# Patient Record
Sex: FEMALE | Race: WHITE | NOT HISPANIC OR LATINO
[De-identification: names, ages, dates, MRNs, and addresses within clinical notes are randomized per-mention and may not be internally consistent; named-entity substitution may affect disease eponyms.]

---

## 2017-11-17 ENCOUNTER — TRANSCRIPTION ENCOUNTER (OUTPATIENT)
Age: 77
End: 2017-11-17

## 2019-04-24 ENCOUNTER — RX RENEWAL (OUTPATIENT)
Age: 79
End: 2019-04-24

## 2019-04-24 DIAGNOSIS — Z87.448 PERSONAL HISTORY OF OTHER DISEASES OF URINARY SYSTEM: ICD-10-CM

## 2019-04-24 DIAGNOSIS — Z87.440 PERSONAL HISTORY OF URINARY (TRACT) INFECTIONS: ICD-10-CM

## 2019-04-24 DIAGNOSIS — N13.2 HYDRONEPHROSIS WITH RENAL AND URETERAL CALCULOUS OBSTRUCTION: ICD-10-CM

## 2019-05-22 ENCOUNTER — APPOINTMENT (OUTPATIENT)
Dept: INTERNAL MEDICINE | Facility: CLINIC | Age: 79
End: 2019-05-22
Payer: MEDICARE

## 2019-05-22 VITALS
SYSTOLIC BLOOD PRESSURE: 130 MMHG | BODY MASS INDEX: 22.66 KG/M2 | DIASTOLIC BLOOD PRESSURE: 70 MMHG | RESPIRATION RATE: 16 BRPM | WEIGHT: 120 LBS | HEIGHT: 61 IN

## 2019-05-22 DIAGNOSIS — I10 ESSENTIAL (PRIMARY) HYPERTENSION: ICD-10-CM

## 2019-05-22 DIAGNOSIS — Z87.442 PERSONAL HISTORY OF URINARY CALCULI: ICD-10-CM

## 2019-05-22 PROCEDURE — 99213 OFFICE O/P EST LOW 20 MIN: CPT

## 2019-05-22 NOTE — HISTORY OF PRESENT ILLNESS
[FreeTextEntry1] : f/u on bp [de-identified] : hx htn on rxn since 6/16--had anxiety but controlled now

## 2019-06-05 ENCOUNTER — RX RENEWAL (OUTPATIENT)
Age: 79
End: 2019-06-05

## 2019-06-12 ENCOUNTER — RX RENEWAL (OUTPATIENT)
Age: 79
End: 2019-06-12

## 2019-10-05 ENCOUNTER — RECORD ABSTRACTING (OUTPATIENT)
Age: 79
End: 2019-10-05

## 2019-10-05 DIAGNOSIS — I70.0 ATHEROSCLEROSIS OF AORTA: ICD-10-CM

## 2019-10-05 DIAGNOSIS — M72.2 PLANTAR FASCIAL FIBROMATOSIS: ICD-10-CM

## 2019-10-05 DIAGNOSIS — M48.02 SPINAL STENOSIS, CERVICAL REGION: ICD-10-CM

## 2022-01-01 ENCOUNTER — NON-APPOINTMENT (OUTPATIENT)
Age: 82
End: 2022-01-01

## 2022-01-01 ENCOUNTER — APPOINTMENT (OUTPATIENT)
Dept: INTERNAL MEDICINE | Facility: CLINIC | Age: 82
End: 2022-01-01

## 2022-01-01 ENCOUNTER — TRANSCRIPTION ENCOUNTER (OUTPATIENT)
Age: 82
End: 2022-01-01

## 2022-01-01 ENCOUNTER — LABORATORY RESULT (OUTPATIENT)
Age: 82
End: 2022-01-01

## 2022-01-01 ENCOUNTER — APPOINTMENT (OUTPATIENT)
Dept: MRI IMAGING | Facility: CLINIC | Age: 82
End: 2022-01-01

## 2022-01-01 ENCOUNTER — EMERGENCY (EMERGENCY)
Facility: HOSPITAL | Age: 82
LOS: 1 days | Discharge: HOME | End: 2022-01-01
Attending: STUDENT IN AN ORGANIZED HEALTH CARE EDUCATION/TRAINING PROGRAM | Admitting: STUDENT IN AN ORGANIZED HEALTH CARE EDUCATION/TRAINING PROGRAM

## 2022-01-01 ENCOUNTER — APPOINTMENT (OUTPATIENT)
Dept: HEMATOLOGY ONCOLOGY | Facility: CLINIC | Age: 82
End: 2022-01-01

## 2022-01-01 ENCOUNTER — RESULT REVIEW (OUTPATIENT)
Age: 82
End: 2022-01-01

## 2022-01-01 ENCOUNTER — OUTPATIENT (OUTPATIENT)
Dept: OUTPATIENT SERVICES | Facility: HOSPITAL | Age: 82
LOS: 1 days | End: 2022-01-01
Payer: MEDICARE

## 2022-01-01 ENCOUNTER — APPOINTMENT (OUTPATIENT)
Dept: NUCLEAR MEDICINE | Facility: IMAGING CENTER | Age: 82
End: 2022-01-01

## 2022-01-01 ENCOUNTER — INPATIENT (INPATIENT)
Facility: HOSPITAL | Age: 82
LOS: 6 days | Discharge: HOME | End: 2022-11-01
Attending: INTERNAL MEDICINE | Admitting: INTERNAL MEDICINE

## 2022-01-01 ENCOUNTER — APPOINTMENT (OUTPATIENT)
Dept: RADIATION ONCOLOGY | Facility: CLINIC | Age: 82
End: 2022-01-01

## 2022-01-01 ENCOUNTER — APPOINTMENT (OUTPATIENT)
Dept: INTERNAL MEDICINE | Facility: CLINIC | Age: 82
End: 2022-01-01
Payer: MEDICARE

## 2022-01-01 ENCOUNTER — OUTPATIENT (OUTPATIENT)
Dept: OUTPATIENT SERVICES | Facility: HOSPITAL | Age: 82
LOS: 1 days | Discharge: HOME | End: 2022-01-01

## 2022-01-01 ENCOUNTER — APPOINTMENT (OUTPATIENT)
Dept: CT IMAGING | Facility: IMAGING CENTER | Age: 82
End: 2022-01-01

## 2022-01-01 VITALS
HEART RATE: 81 BPM | WEIGHT: 110 LBS | HEIGHT: 58.43 IN | TEMPERATURE: 208.76 F | BODY MASS INDEX: 22.78 KG/M2 | OXYGEN SATURATION: 99 %

## 2022-01-01 VITALS
RESPIRATION RATE: 16 BRPM | BODY MASS INDEX: 23 KG/M2 | HEART RATE: 78 BPM | SYSTOLIC BLOOD PRESSURE: 148 MMHG | OXYGEN SATURATION: 100 % | DIASTOLIC BLOOD PRESSURE: 87 MMHG | TEMPERATURE: 98.6 F | WEIGHT: 111.66 LBS

## 2022-01-01 VITALS
OXYGEN SATURATION: 98 % | HEIGHT: 61 IN | BODY MASS INDEX: 22.66 KG/M2 | WEIGHT: 120 LBS | HEART RATE: 75 BPM | TEMPERATURE: 97.8 F

## 2022-01-01 VITALS
OXYGEN SATURATION: 100 % | DIASTOLIC BLOOD PRESSURE: 82 MMHG | WEIGHT: 109.35 LBS | SYSTOLIC BLOOD PRESSURE: 149 MMHG | HEART RATE: 86 BPM | TEMPERATURE: 208.58 F | HEIGHT: 58.78 IN | BODY MASS INDEX: 22.34 KG/M2 | RESPIRATION RATE: 16 BRPM

## 2022-01-01 VITALS
OXYGEN SATURATION: 100 % | RESPIRATION RATE: 16 BRPM | HEART RATE: 81 BPM | DIASTOLIC BLOOD PRESSURE: 75 MMHG | SYSTOLIC BLOOD PRESSURE: 155 MMHG | TEMPERATURE: 98 F | HEIGHT: 60 IN

## 2022-01-01 VITALS
TEMPERATURE: 98 F | HEART RATE: 82 BPM | HEIGHT: 58 IN | SYSTOLIC BLOOD PRESSURE: 173 MMHG | DIASTOLIC BLOOD PRESSURE: 69 MMHG | OXYGEN SATURATION: 99 % | RESPIRATION RATE: 16 BRPM

## 2022-01-01 VITALS — SYSTOLIC BLOOD PRESSURE: 126 MMHG | DIASTOLIC BLOOD PRESSURE: 76 MMHG

## 2022-01-01 VITALS
OXYGEN SATURATION: 98 % | RESPIRATION RATE: 18 BRPM | HEART RATE: 88 BPM | DIASTOLIC BLOOD PRESSURE: 50 MMHG | TEMPERATURE: 99 F | SYSTOLIC BLOOD PRESSURE: 147 MMHG

## 2022-01-01 VITALS — SYSTOLIC BLOOD PRESSURE: 126 MMHG | DIASTOLIC BLOOD PRESSURE: 74 MMHG

## 2022-01-01 VITALS
BODY MASS INDEX: 22.28 KG/M2 | WEIGHT: 117.99 LBS | TEMPERATURE: 98.1 F | HEART RATE: 81 BPM | HEIGHT: 61 IN | RESPIRATION RATE: 16 BRPM | SYSTOLIC BLOOD PRESSURE: 135 MMHG | DIASTOLIC BLOOD PRESSURE: 74 MMHG | OXYGEN SATURATION: 100 %

## 2022-01-01 VITALS
RESPIRATION RATE: 19 BRPM | DIASTOLIC BLOOD PRESSURE: 93 MMHG | SYSTOLIC BLOOD PRESSURE: 174 MMHG | HEART RATE: 87 BPM | OXYGEN SATURATION: 100 %

## 2022-01-01 DIAGNOSIS — Z98.89 OTHER SPECIFIED POSTPROCEDURAL STATES: Chronic | ICD-10-CM

## 2022-01-01 DIAGNOSIS — E78.5 HYPERLIPIDEMIA, UNSPECIFIED: ICD-10-CM

## 2022-01-01 DIAGNOSIS — N39.0 URINARY TRACT INFECTION, SITE NOT SPECIFIED: ICD-10-CM

## 2022-01-01 DIAGNOSIS — C79.9 SECONDARY MALIGNANT NEOPLASM OF UNSPECIFIED SITE: ICD-10-CM

## 2022-01-01 DIAGNOSIS — C80.1 MALIGNANT (PRIMARY) NEOPLASM, UNSPECIFIED: ICD-10-CM

## 2022-01-01 DIAGNOSIS — C25.9 MALIGNANT NEOPLASM OF PANCREAS, UNSPECIFIED: ICD-10-CM

## 2022-01-01 DIAGNOSIS — Z29.9 ENCOUNTER FOR PROPHYLACTIC MEASURES, UNSPECIFIED: ICD-10-CM

## 2022-01-01 DIAGNOSIS — N17.9 ACUTE KIDNEY FAILURE, UNSPECIFIED: ICD-10-CM

## 2022-01-01 DIAGNOSIS — Z29.8 ENCOUNTER FOR OTHER SPECIFIED PROPHYLACTIC MEASURES: ICD-10-CM

## 2022-01-01 DIAGNOSIS — R91.1 SOLITARY PULMONARY NODULE: ICD-10-CM

## 2022-01-01 DIAGNOSIS — N88.8 OTHER SPECIFIED NONINFLAMMATORY DISORDERS OF CERVIX UTERI: ICD-10-CM

## 2022-01-01 DIAGNOSIS — Z51.5 ENCOUNTER FOR PALLIATIVE CARE: ICD-10-CM

## 2022-01-01 DIAGNOSIS — E87.1 HYPO-OSMOLALITY AND HYPONATREMIA: ICD-10-CM

## 2022-01-01 DIAGNOSIS — L98.9 DISORDER OF THE SKIN AND SUBCUTANEOUS TISSUE, UNSPECIFIED: ICD-10-CM

## 2022-01-01 DIAGNOSIS — Z00.00 ENCOUNTER FOR GENERAL ADULT MEDICAL EXAMINATION W/OUT ABNORMAL FINDINGS: ICD-10-CM

## 2022-01-01 DIAGNOSIS — R91.8 OTHER NONSPECIFIC ABNORMAL FINDING OF LUNG FIELD: ICD-10-CM

## 2022-01-01 DIAGNOSIS — D72.829 ELEVATED WHITE BLOOD CELL COUNT, UNSPECIFIED: ICD-10-CM

## 2022-01-01 DIAGNOSIS — Z71.89 OTHER SPECIFIED COUNSELING: ICD-10-CM

## 2022-01-01 DIAGNOSIS — C34.90 MALIGNANT NEOPLASM OF UNSPECIFIED PART OF UNSPECIFIED BRONCHUS OR LUNG: ICD-10-CM

## 2022-01-01 DIAGNOSIS — F41.9 ANXIETY DISORDER, UNSPECIFIED: ICD-10-CM

## 2022-01-01 LAB
-  AMIKACIN: SIGNIFICANT CHANGE UP
-  AMIKACIN: SIGNIFICANT CHANGE UP
-  AMOXICILLIN/CLAVULANIC ACID: SIGNIFICANT CHANGE UP
-  AMOXICILLIN/CLAVULANIC ACID: SIGNIFICANT CHANGE UP
-  AMPICILLIN/SULBACTAM: SIGNIFICANT CHANGE UP
-  AMPICILLIN/SULBACTAM: SIGNIFICANT CHANGE UP
-  AMPICILLIN: SIGNIFICANT CHANGE UP
-  AZTREONAM: SIGNIFICANT CHANGE UP
-  AZTREONAM: SIGNIFICANT CHANGE UP
-  CEFAZOLIN: SIGNIFICANT CHANGE UP
-  CEFAZOLIN: SIGNIFICANT CHANGE UP
-  CEFEPIME: SIGNIFICANT CHANGE UP
-  CEFEPIME: SIGNIFICANT CHANGE UP
-  CEFOXITIN: SIGNIFICANT CHANGE UP
-  CEFOXITIN: SIGNIFICANT CHANGE UP
-  CEFTRIAXONE: SIGNIFICANT CHANGE UP
-  CEFTRIAXONE: SIGNIFICANT CHANGE UP
-  CIPROFLOXACIN: SIGNIFICANT CHANGE UP
-  ERTAPENEM: SIGNIFICANT CHANGE UP
-  ERTAPENEM: SIGNIFICANT CHANGE UP
-  GENTAMICIN: SIGNIFICANT CHANGE UP
-  GENTAMICIN: SIGNIFICANT CHANGE UP
-  IMIPENEM: SIGNIFICANT CHANGE UP
-  LEVOFLOXACIN: SIGNIFICANT CHANGE UP
-  MEROPENEM: SIGNIFICANT CHANGE UP
-  MEROPENEM: SIGNIFICANT CHANGE UP
-  NITROFURANTOIN: SIGNIFICANT CHANGE UP
-  PIPERACILLIN/TAZOBACTAM: SIGNIFICANT CHANGE UP
-  PIPERACILLIN/TAZOBACTAM: SIGNIFICANT CHANGE UP
-  TETRACYCLINE: SIGNIFICANT CHANGE UP
-  TIGECYCLINE: SIGNIFICANT CHANGE UP
-  TOBRAMYCIN: SIGNIFICANT CHANGE UP
-  TOBRAMYCIN: SIGNIFICANT CHANGE UP
-  TRIMETHOPRIM/SULFAMETHOXAZOLE: SIGNIFICANT CHANGE UP
-  TRIMETHOPRIM/SULFAMETHOXAZOLE: SIGNIFICANT CHANGE UP
-  VANCOMYCIN: SIGNIFICANT CHANGE UP
ALBUMIN SERPL ELPH-MCNC: 4 G/DL
ALBUMIN SERPL ELPH-MCNC: 4.1 G/DL — SIGNIFICANT CHANGE UP (ref 3.3–5)
ALBUMIN SERPL ELPH-MCNC: 4.3 G/DL — SIGNIFICANT CHANGE UP (ref 3.3–5)
ALBUMIN SERPL ELPH-MCNC: 4.6 G/DL
ALP BLD-CCNC: 57 U/L
ALP BLD-CCNC: 60 U/L
ALP SERPL-CCNC: 59 U/L — SIGNIFICANT CHANGE UP (ref 40–120)
ALP SERPL-CCNC: 63 U/L — SIGNIFICANT CHANGE UP (ref 40–120)
ALT FLD-CCNC: 12 U/L — SIGNIFICANT CHANGE UP (ref 4–33)
ALT FLD-CCNC: 15 U/L — SIGNIFICANT CHANGE UP (ref 4–33)
ALT SERPL-CCNC: 15 U/L
ALT SERPL-CCNC: 18 U/L
ANION GAP SERPL CALC-SCNC: 10 MMOL/L — SIGNIFICANT CHANGE UP (ref 7–14)
ANION GAP SERPL CALC-SCNC: 10 MMOL/L — SIGNIFICANT CHANGE UP (ref 7–14)
ANION GAP SERPL CALC-SCNC: 11 MMOL/L — SIGNIFICANT CHANGE UP (ref 7–14)
ANION GAP SERPL CALC-SCNC: 12 MMOL/L — SIGNIFICANT CHANGE UP (ref 7–14)
ANION GAP SERPL CALC-SCNC: 13 MMOL/L
ANION GAP SERPL CALC-SCNC: 15 MMOL/L — HIGH (ref 7–14)
ANION GAP SERPL CALC-SCNC: 16 MMOL/L
APPEARANCE UR: ABNORMAL
APPEARANCE UR: CLEAR — SIGNIFICANT CHANGE UP
APPEARANCE: ABNORMAL
APTT BLD: 27.6 SEC — SIGNIFICANT CHANGE UP (ref 27–36.3)
APTT BLD: 28.2 SEC — SIGNIFICANT CHANGE UP (ref 27–36.3)
APTT BLD: 28.9 SEC — SIGNIFICANT CHANGE UP (ref 27–36.3)
APTT BLD: 29.9 SEC — SIGNIFICANT CHANGE UP (ref 27–36.3)
AST SERPL-CCNC: 22 U/L
AST SERPL-CCNC: 22 U/L — SIGNIFICANT CHANGE UP (ref 4–32)
AST SERPL-CCNC: 24 U/L
AST SERPL-CCNC: 24 U/L — SIGNIFICANT CHANGE UP (ref 4–32)
BACTERIA # UR AUTO: ABNORMAL
BACTERIA # UR AUTO: NEGATIVE — SIGNIFICANT CHANGE UP
BACTERIA: NEGATIVE
BASE EXCESS BLDV CALC-SCNC: 3.2 MMOL/L — HIGH (ref -2–3)
BASOPHILS # BLD AUTO: 0.03 K/UL — SIGNIFICANT CHANGE UP (ref 0–0.2)
BASOPHILS # BLD AUTO: 0.05 K/UL — SIGNIFICANT CHANGE UP (ref 0–0.2)
BASOPHILS # BLD AUTO: 0.06 K/UL — SIGNIFICANT CHANGE UP (ref 0–0.2)
BASOPHILS # BLD AUTO: 0.06 K/UL — SIGNIFICANT CHANGE UP (ref 0–0.2)
BASOPHILS NFR BLD AUTO: 0.2 % — SIGNIFICANT CHANGE UP (ref 0–2)
BASOPHILS NFR BLD AUTO: 0.4 % — SIGNIFICANT CHANGE UP (ref 0–2)
BASOPHILS NFR BLD AUTO: 0.4 % — SIGNIFICANT CHANGE UP (ref 0–2)
BASOPHILS NFR BLD AUTO: 0.5 % — SIGNIFICANT CHANGE UP (ref 0–2)
BILIRUB SERPL-MCNC: 0.2 MG/DL
BILIRUB SERPL-MCNC: 0.3 MG/DL
BILIRUB SERPL-MCNC: 0.3 MG/DL — SIGNIFICANT CHANGE UP (ref 0.2–1.2)
BILIRUB SERPL-MCNC: <0.2 MG/DL — SIGNIFICANT CHANGE UP (ref 0.2–1.2)
BILIRUB UR-MCNC: NEGATIVE — SIGNIFICANT CHANGE UP
BILIRUB UR-MCNC: NEGATIVE — SIGNIFICANT CHANGE UP
BILIRUBIN URINE: NEGATIVE
BLD GP AB SCN SERPL QL: NEGATIVE — SIGNIFICANT CHANGE UP
BLD GP AB SCN SERPL QL: NEGATIVE — SIGNIFICANT CHANGE UP
BLOOD GAS VENOUS COMPREHENSIVE RESULT: SIGNIFICANT CHANGE UP
BLOOD URINE: ABNORMAL
BUN SERPL-MCNC: 22 MG/DL — SIGNIFICANT CHANGE UP (ref 7–23)
BUN SERPL-MCNC: 25 MG/DL — HIGH (ref 7–23)
BUN SERPL-MCNC: 25 MG/DL — HIGH (ref 7–23)
BUN SERPL-MCNC: 26 MG/DL
BUN SERPL-MCNC: 27 MG/DL
BUN SERPL-MCNC: 28 MG/DL — HIGH (ref 7–23)
BUN SERPL-MCNC: 28 MG/DL — HIGH (ref 7–23)
BUN SERPL-MCNC: 32 MG/DL — HIGH (ref 7–23)
BUN SERPL-MCNC: 36 MG/DL — HIGH (ref 7–23)
BUN SERPL-MCNC: 37 MG/DL — HIGH (ref 7–23)
CALCIUM SERPL-MCNC: 10.1 MG/DL
CALCIUM SERPL-MCNC: 10.2 MG/DL — SIGNIFICANT CHANGE UP (ref 8.4–10.5)
CALCIUM SERPL-MCNC: 10.6 MG/DL
CALCIUM SERPL-MCNC: 10.7 MG/DL — HIGH (ref 8.4–10.5)
CALCIUM SERPL-MCNC: 9.8 MG/DL — SIGNIFICANT CHANGE UP (ref 8.4–10.5)
CALCIUM SERPL-MCNC: 9.9 MG/DL — SIGNIFICANT CHANGE UP (ref 8.4–10.5)
CALCIUM SERPL-MCNC: 9.9 MG/DL — SIGNIFICANT CHANGE UP (ref 8.4–10.5)
CANCER AG125 SERPL-ACNC: 77 U/ML
CANCER AG19-9 SERPL-ACNC: 182 U/ML
CANCER AG27-29 SERPL-ACNC: 78.8 U/ML
CEA SERPL-MCNC: 91.2 NG/ML
CGA SERPL-MCNC: 206.9 NG/ML
CHLORIDE BLDV-SCNC: 102 MMOL/L — SIGNIFICANT CHANGE UP (ref 96–108)
CHLORIDE SERPL-SCNC: 100 MMOL/L — SIGNIFICANT CHANGE UP (ref 98–107)
CHLORIDE SERPL-SCNC: 100 MMOL/L — SIGNIFICANT CHANGE UP (ref 98–107)
CHLORIDE SERPL-SCNC: 101 MMOL/L — SIGNIFICANT CHANGE UP (ref 98–107)
CHLORIDE SERPL-SCNC: 101 MMOL/L — SIGNIFICANT CHANGE UP (ref 98–107)
CHLORIDE SERPL-SCNC: 102 MMOL/L — SIGNIFICANT CHANGE UP (ref 98–107)
CHLORIDE SERPL-SCNC: 102 MMOL/L — SIGNIFICANT CHANGE UP (ref 98–107)
CHLORIDE SERPL-SCNC: 103 MMOL/L
CHLORIDE SERPL-SCNC: 97 MMOL/L — LOW (ref 98–107)
CHLORIDE SERPL-SCNC: 97 MMOL/L — LOW (ref 98–107)
CHLORIDE SERPL-SCNC: 98 MMOL/L
CHLORIDE UR-SCNC: 46 MMOL/L — SIGNIFICANT CHANGE UP
CO2 BLDV-SCNC: 28 MMOL/L — HIGH (ref 22–26)
CO2 SERPL-SCNC: 23 MMOL/L
CO2 SERPL-SCNC: 23 MMOL/L — SIGNIFICANT CHANGE UP (ref 22–31)
CO2 SERPL-SCNC: 24 MMOL/L
CO2 SERPL-SCNC: 24 MMOL/L — SIGNIFICANT CHANGE UP (ref 22–31)
COLOR SPEC: ABNORMAL
COLOR SPEC: SIGNIFICANT CHANGE UP
COLOR: NORMAL
CREAT SERPL-MCNC: 1.12 MG/DL — SIGNIFICANT CHANGE UP (ref 0.5–1.3)
CREAT SERPL-MCNC: 1.17 MG/DL — SIGNIFICANT CHANGE UP (ref 0.5–1.3)
CREAT SERPL-MCNC: 1.29 MG/DL — SIGNIFICANT CHANGE UP (ref 0.5–1.3)
CREAT SERPL-MCNC: 1.29 MG/DL — SIGNIFICANT CHANGE UP (ref 0.5–1.3)
CREAT SERPL-MCNC: 1.31 MG/DL — HIGH (ref 0.5–1.3)
CREAT SERPL-MCNC: 1.34 MG/DL
CREAT SERPL-MCNC: 1.34 MG/DL — HIGH (ref 0.5–1.3)
CREAT SERPL-MCNC: 1.37 MG/DL
CREAT SERPL-MCNC: 1.37 MG/DL — HIGH (ref 0.5–1.3)
CREAT SERPL-MCNC: 1.63 MG/DL — HIGH (ref 0.5–1.3)
CULTURE RESULTS: SIGNIFICANT CHANGE UP
DIFF PNL FLD: ABNORMAL
DIFF PNL FLD: NEGATIVE — SIGNIFICANT CHANGE UP
EGFR: 31 ML/MIN/1.73M2 — LOW
EGFR: 39 ML/MIN/1.73M2
EGFR: 39 ML/MIN/1.73M2 — LOW
EGFR: 40 ML/MIN/1.73M2
EGFR: 40 ML/MIN/1.73M2 — LOW
EGFR: 41 ML/MIN/1.73M2 — LOW
EGFR: 47 ML/MIN/1.73M2 — LOW
EGFR: 49 ML/MIN/1.73M2 — LOW
EOSINOPHIL # BLD AUTO: 0.03 K/UL — SIGNIFICANT CHANGE UP (ref 0–0.5)
EOSINOPHIL # BLD AUTO: 0.06 K/UL — SIGNIFICANT CHANGE UP (ref 0–0.5)
EOSINOPHIL # BLD AUTO: 0.09 K/UL — SIGNIFICANT CHANGE UP (ref 0–0.5)
EOSINOPHIL # BLD AUTO: 0.1 K/UL — SIGNIFICANT CHANGE UP (ref 0–0.5)
EOSINOPHIL # BLD AUTO: 0.1 K/UL — SIGNIFICANT CHANGE UP (ref 0–0.5)
EOSINOPHIL # BLD AUTO: 0.14 K/UL — SIGNIFICANT CHANGE UP (ref 0–0.5)
EOSINOPHIL NFR BLD AUTO: 0.3 % — SIGNIFICANT CHANGE UP (ref 0–6)
EOSINOPHIL NFR BLD AUTO: 0.4 % — SIGNIFICANT CHANGE UP (ref 0–6)
EOSINOPHIL NFR BLD AUTO: 0.7 % — SIGNIFICANT CHANGE UP (ref 0–6)
EOSINOPHIL NFR BLD AUTO: 0.9 % — SIGNIFICANT CHANGE UP (ref 0–6)
EOSINOPHIL NFR BLD AUTO: 0.9 % — SIGNIFICANT CHANGE UP (ref 0–6)
EOSINOPHIL NFR BLD AUTO: 1.4 % — SIGNIFICANT CHANGE UP (ref 0–6)
EPI CELLS # UR: 1 /HPF — SIGNIFICANT CHANGE UP (ref 0–5)
EPI CELLS # UR: 1 /HPF — SIGNIFICANT CHANGE UP (ref 0–5)
FLUAV AG NPH QL: SIGNIFICANT CHANGE UP
FLUBV AG NPH QL: SIGNIFICANT CHANGE UP
GAS PNL BLDV: 132 MMOL/L — LOW (ref 136–145)
GAS PNL BLDV: SIGNIFICANT CHANGE UP
GLUCOSE BLDV-MCNC: 108 MG/DL — HIGH (ref 70–99)
GLUCOSE QUALITATIVE U: NEGATIVE
GLUCOSE SERPL-MCNC: 101 MG/DL
GLUCOSE SERPL-MCNC: 102 MG/DL
GLUCOSE SERPL-MCNC: 104 MG/DL — HIGH (ref 70–99)
GLUCOSE SERPL-MCNC: 110 MG/DL — HIGH (ref 70–99)
GLUCOSE SERPL-MCNC: 92 MG/DL — SIGNIFICANT CHANGE UP (ref 70–99)
GLUCOSE SERPL-MCNC: 94 MG/DL — SIGNIFICANT CHANGE UP (ref 70–99)
GLUCOSE SERPL-MCNC: 96 MG/DL — SIGNIFICANT CHANGE UP (ref 70–99)
GLUCOSE SERPL-MCNC: 98 MG/DL — SIGNIFICANT CHANGE UP (ref 70–99)
GLUCOSE SERPL-MCNC: 98 MG/DL — SIGNIFICANT CHANGE UP (ref 70–99)
GLUCOSE SERPL-MCNC: 99 MG/DL — SIGNIFICANT CHANGE UP (ref 70–99)
GLUCOSE UR QL: NEGATIVE — SIGNIFICANT CHANGE UP
GLUCOSE UR QL: NEGATIVE — SIGNIFICANT CHANGE UP
GRAM STN FLD: SIGNIFICANT CHANGE UP
HCO3 BLDV-SCNC: 27 MMOL/L — SIGNIFICANT CHANGE UP (ref 22–29)
HCT VFR BLD CALC: 31.1 % — LOW (ref 34.5–45)
HCT VFR BLD CALC: 31.6 % — LOW (ref 34.5–45)
HCT VFR BLD CALC: 32.8 % — LOW (ref 34.5–45)
HCT VFR BLD CALC: 33.1 % — LOW (ref 34.5–45)
HCT VFR BLD CALC: 33.1 % — LOW (ref 34.5–45)
HCT VFR BLD CALC: 33.4 % — LOW (ref 34.5–45)
HCT VFR BLD CALC: 34 % — LOW (ref 34.5–45)
HCT VFR BLD CALC: 34.9 % — SIGNIFICANT CHANGE UP (ref 34.5–45)
HCT VFR BLD CALC: 35.7 % — SIGNIFICANT CHANGE UP (ref 34.5–45)
HCT VFR BLD CALC: 39.1 % — SIGNIFICANT CHANGE UP (ref 34.5–45)
HCT VFR BLDA CALC: 36 % — SIGNIFICANT CHANGE UP (ref 34.5–46.5)
HGB BLD CALC-MCNC: 12 G/DL — SIGNIFICANT CHANGE UP (ref 11.5–15.5)
HGB BLD-MCNC: 10.3 G/DL — LOW (ref 11.5–15.5)
HGB BLD-MCNC: 10.4 G/DL — LOW (ref 11.5–15.5)
HGB BLD-MCNC: 10.8 G/DL — LOW (ref 11.5–15.5)
HGB BLD-MCNC: 10.8 G/DL — LOW (ref 11.5–15.5)
HGB BLD-MCNC: 10.9 G/DL — LOW (ref 11.5–15.5)
HGB BLD-MCNC: 11.7 G/DL — SIGNIFICANT CHANGE UP (ref 11.5–15.5)
HGB BLD-MCNC: 11.8 G/DL — SIGNIFICANT CHANGE UP (ref 11.5–15.5)
HGB BLD-MCNC: 12.6 G/DL — SIGNIFICANT CHANGE UP (ref 11.5–15.5)
HYALINE CASTS: 0 /LPF
IANC: 11.31 K/UL — HIGH (ref 1.8–7.4)
IANC: 11.76 K/UL — HIGH (ref 1.8–7.4)
IANC: 6.99 K/UL — SIGNIFICANT CHANGE UP (ref 1.8–7.4)
IANC: 7.95 K/UL — HIGH (ref 1.8–7.4)
IMM GRANULOCYTES NFR BLD AUTO: 0.3 % — SIGNIFICANT CHANGE UP (ref 0–0.9)
IMM GRANULOCYTES NFR BLD AUTO: 0.4 % — SIGNIFICANT CHANGE UP (ref 0–0.9)
IMM GRANULOCYTES NFR BLD AUTO: 0.5 % — SIGNIFICANT CHANGE UP (ref 0–0.9)
IMM GRANULOCYTES NFR BLD AUTO: 0.8 % — SIGNIFICANT CHANGE UP (ref 0–0.9)
INR BLD: 0.97 RATIO — SIGNIFICANT CHANGE UP (ref 0.88–1.16)
INR BLD: 0.98 RATIO — SIGNIFICANT CHANGE UP (ref 0.88–1.16)
INR BLD: 1 RATIO — SIGNIFICANT CHANGE UP (ref 0.88–1.16)
INR BLD: 1.03 RATIO — SIGNIFICANT CHANGE UP (ref 0.88–1.16)
KETONES UR-MCNC: NEGATIVE — SIGNIFICANT CHANGE UP
KETONES UR-MCNC: NEGATIVE — SIGNIFICANT CHANGE UP
KETONES URINE: NEGATIVE
LACTATE BLDV-MCNC: 1.8 MMOL/L — SIGNIFICANT CHANGE UP (ref 0.5–2)
LEUKOCYTE ESTERASE UR-ACNC: ABNORMAL
LEUKOCYTE ESTERASE UR-ACNC: NEGATIVE — SIGNIFICANT CHANGE UP
LEUKOCYTE ESTERASE URINE: ABNORMAL
LIDOCAIN IGE QN: 32 U/L — SIGNIFICANT CHANGE UP (ref 7–60)
LYMPHOCYTES # BLD AUTO: 1.54 K/UL — SIGNIFICANT CHANGE UP (ref 1–3.3)
LYMPHOCYTES # BLD AUTO: 1.73 K/UL — SIGNIFICANT CHANGE UP (ref 1–3.3)
LYMPHOCYTES # BLD AUTO: 1.76 K/UL — SIGNIFICANT CHANGE UP (ref 1–3.3)
LYMPHOCYTES # BLD AUTO: 1.77 K/UL — SIGNIFICANT CHANGE UP (ref 1–3.3)
LYMPHOCYTES # BLD AUTO: 10.5 % — LOW (ref 13–44)
LYMPHOCYTES # BLD AUTO: 12.4 % — LOW (ref 13–44)
LYMPHOCYTES # BLD AUTO: 14.9 % — SIGNIFICANT CHANGE UP (ref 13–44)
LYMPHOCYTES # BLD AUTO: 16.8 % — SIGNIFICANT CHANGE UP (ref 13–44)
LYMPHOCYTES # BLD AUTO: 18.9 % — SIGNIFICANT CHANGE UP (ref 13–44)
LYMPHOCYTES # BLD AUTO: 19.3 % — SIGNIFICANT CHANGE UP (ref 13–44)
LYMPHOCYTES # BLD AUTO: 2.01 K/UL — SIGNIFICANT CHANGE UP (ref 1–3.3)
LYMPHOCYTES # BLD AUTO: 2.15 K/UL — SIGNIFICANT CHANGE UP (ref 1–3.3)
MAGNESIUM SERPL-MCNC: 1.9 MG/DL — SIGNIFICANT CHANGE UP (ref 1.6–2.6)
MAGNESIUM SERPL-MCNC: 2 MG/DL — SIGNIFICANT CHANGE UP (ref 1.6–2.6)
MAGNESIUM SERPL-MCNC: 2.1 MG/DL — SIGNIFICANT CHANGE UP (ref 1.6–2.6)
MAGNESIUM SERPL-MCNC: 2.1 MG/DL — SIGNIFICANT CHANGE UP (ref 1.6–2.6)
MAGNESIUM SERPL-MCNC: 2.2 MG/DL — SIGNIFICANT CHANGE UP (ref 1.6–2.6)
MAGNESIUM SERPL-MCNC: 2.3 MG/DL — SIGNIFICANT CHANGE UP (ref 1.6–2.6)
MCHC RBC-ENTMCNC: 27 PG — SIGNIFICANT CHANGE UP (ref 27–34)
MCHC RBC-ENTMCNC: 27.2 PG — SIGNIFICANT CHANGE UP (ref 27–34)
MCHC RBC-ENTMCNC: 27.3 PG — SIGNIFICANT CHANGE UP (ref 27–34)
MCHC RBC-ENTMCNC: 27.4 PG — SIGNIFICANT CHANGE UP (ref 27–34)
MCHC RBC-ENTMCNC: 27.5 PG — SIGNIFICANT CHANGE UP (ref 27–34)
MCHC RBC-ENTMCNC: 27.5 PG — SIGNIFICANT CHANGE UP (ref 27–34)
MCHC RBC-ENTMCNC: 27.6 PG — SIGNIFICANT CHANGE UP (ref 27–34)
MCHC RBC-ENTMCNC: 27.7 PG — SIGNIFICANT CHANGE UP (ref 27–34)
MCHC RBC-ENTMCNC: 32.1 GM/DL — SIGNIFICANT CHANGE UP (ref 32–36)
MCHC RBC-ENTMCNC: 32.2 G/DL — SIGNIFICANT CHANGE UP (ref 32–36)
MCHC RBC-ENTMCNC: 32.6 G/DL — SIGNIFICANT CHANGE UP (ref 32–36)
MCHC RBC-ENTMCNC: 32.6 GM/DL — SIGNIFICANT CHANGE UP (ref 32–36)
MCHC RBC-ENTMCNC: 32.6 GM/DL — SIGNIFICANT CHANGE UP (ref 32–36)
MCHC RBC-ENTMCNC: 32.9 GM/DL — SIGNIFICANT CHANGE UP (ref 32–36)
MCHC RBC-ENTMCNC: 32.9 GM/DL — SIGNIFICANT CHANGE UP (ref 32–36)
MCHC RBC-ENTMCNC: 33.1 GM/DL — SIGNIFICANT CHANGE UP (ref 32–36)
MCHC RBC-ENTMCNC: 33.4 GM/DL — SIGNIFICANT CHANGE UP (ref 32–36)
MCHC RBC-ENTMCNC: 33.5 GM/DL — SIGNIFICANT CHANGE UP (ref 32–36)
MCV RBC AUTO: 81.9 FL — SIGNIFICANT CHANGE UP (ref 80–100)
MCV RBC AUTO: 82.5 FL — SIGNIFICANT CHANGE UP (ref 80–100)
MCV RBC AUTO: 82.8 FL — SIGNIFICANT CHANGE UP (ref 80–100)
MCV RBC AUTO: 82.8 FL — SIGNIFICANT CHANGE UP (ref 80–100)
MCV RBC AUTO: 82.9 FL — SIGNIFICANT CHANGE UP (ref 80–100)
MCV RBC AUTO: 82.9 FL — SIGNIFICANT CHANGE UP (ref 80–100)
MCV RBC AUTO: 83.7 FL — SIGNIFICANT CHANGE UP (ref 80–100)
MCV RBC AUTO: 83.7 FL — SIGNIFICANT CHANGE UP (ref 80–100)
MCV RBC AUTO: 84.4 FL — SIGNIFICANT CHANGE UP (ref 80–100)
MCV RBC AUTO: 85.2 FL — SIGNIFICANT CHANGE UP (ref 80–100)
METHOD TYPE: SIGNIFICANT CHANGE UP
MICROSCOPIC-UA: NORMAL
MONOCYTES # BLD AUTO: 0.78 K/UL — SIGNIFICANT CHANGE UP (ref 0–0.9)
MONOCYTES # BLD AUTO: 0.91 K/UL — HIGH (ref 0–0.9)
MONOCYTES # BLD AUTO: 0.98 K/UL — HIGH (ref 0–0.9)
MONOCYTES # BLD AUTO: 1.05 K/UL — HIGH (ref 0–0.9)
MONOCYTES # BLD AUTO: 1.17 K/UL — HIGH (ref 0–0.9)
MONOCYTES # BLD AUTO: 1.35 K/UL — HIGH (ref 0–0.9)
MONOCYTES NFR BLD AUTO: 13.1 % — SIGNIFICANT CHANGE UP (ref 2–14)
MONOCYTES NFR BLD AUTO: 6.6 % — SIGNIFICANT CHANGE UP (ref 2–14)
MONOCYTES NFR BLD AUTO: 6.9 % — SIGNIFICANT CHANGE UP (ref 2–14)
MONOCYTES NFR BLD AUTO: 8 % — SIGNIFICANT CHANGE UP (ref 2–14)
MONOCYTES NFR BLD AUTO: 8.8 % — SIGNIFICANT CHANGE UP (ref 2–14)
MONOCYTES NFR BLD AUTO: 9.2 % — SIGNIFICANT CHANGE UP (ref 2–14)
MRSA PCR RESULT.: SIGNIFICANT CHANGE UP
NEUTROPHILS # BLD AUTO: 11.31 K/UL — HIGH (ref 1.8–7.4)
NEUTROPHILS # BLD AUTO: 11.76 K/UL — HIGH (ref 1.8–7.4)
NEUTROPHILS # BLD AUTO: 6.99 K/UL — SIGNIFICANT CHANGE UP (ref 1.8–7.4)
NEUTROPHILS # BLD AUTO: 7.3 K/UL — SIGNIFICANT CHANGE UP (ref 1.8–7.4)
NEUTROPHILS # BLD AUTO: 7.95 K/UL — HIGH (ref 1.8–7.4)
NEUTROPHILS # BLD AUTO: 9.12 K/UL — HIGH (ref 1.8–7.4)
NEUTROPHILS NFR BLD AUTO: 67.7 % — SIGNIFICANT CHANGE UP (ref 43–77)
NEUTROPHILS NFR BLD AUTO: 69.7 % — SIGNIFICANT CHANGE UP (ref 43–77)
NEUTROPHILS NFR BLD AUTO: 70.1 % — SIGNIFICANT CHANGE UP (ref 43–77)
NEUTROPHILS NFR BLD AUTO: 77.5 % — HIGH (ref 43–77)
NEUTROPHILS NFR BLD AUTO: 79.6 % — HIGH (ref 43–77)
NEUTROPHILS NFR BLD AUTO: 79.9 % — HIGH (ref 43–77)
NIGHT BLUE STAIN TISS: SIGNIFICANT CHANGE UP
NITRITE UR-MCNC: NEGATIVE — SIGNIFICANT CHANGE UP
NITRITE UR-MCNC: POSITIVE
NITRITE URINE: NEGATIVE
NON-GYNECOLOGICAL CYTOLOGY STUDY: SIGNIFICANT CHANGE UP
NRBC # BLD: 0 /100 WBCS — SIGNIFICANT CHANGE UP (ref 0–0)
NRBC # FLD: 0 K/UL — SIGNIFICANT CHANGE UP (ref 0–0)
ORGANISM # SPEC MICROSCOPIC CNT: SIGNIFICANT CHANGE UP
PCO2 BLDV: 37 MMHG — LOW (ref 39–42)
PH BLDV: 7.47 — HIGH (ref 7.32–7.43)
PH UR: 6.5 — SIGNIFICANT CHANGE UP (ref 5–8)
PH UR: 7.5 — SIGNIFICANT CHANGE UP (ref 5–8)
PH URINE: 6
PHOSPHATE SERPL-MCNC: 2.8 MG/DL — SIGNIFICANT CHANGE UP (ref 2.5–4.5)
PHOSPHATE SERPL-MCNC: 3.4 MG/DL — SIGNIFICANT CHANGE UP (ref 2.5–4.5)
PHOSPHATE SERPL-MCNC: 3.6 MG/DL — SIGNIFICANT CHANGE UP (ref 2.5–4.5)
PHOSPHATE SERPL-MCNC: 3.6 MG/DL — SIGNIFICANT CHANGE UP (ref 2.5–4.5)
PHOSPHATE SERPL-MCNC: 3.8 MG/DL — SIGNIFICANT CHANGE UP (ref 2.5–4.5)
PHOSPHATE SERPL-MCNC: 3.8 MG/DL — SIGNIFICANT CHANGE UP (ref 2.5–4.5)
PHOSPHATE SERPL-MCNC: 4.3 MG/DL — SIGNIFICANT CHANGE UP (ref 2.5–4.5)
PHOSPHATE SERPL-MCNC: 4.4 MG/DL — SIGNIFICANT CHANGE UP (ref 2.5–4.5)
PLATELET # BLD AUTO: 244 K/UL — SIGNIFICANT CHANGE UP (ref 150–400)
PLATELET # BLD AUTO: 250 K/UL — SIGNIFICANT CHANGE UP (ref 150–400)
PLATELET # BLD AUTO: 254 K/UL — SIGNIFICANT CHANGE UP (ref 150–400)
PLATELET # BLD AUTO: 254 K/UL — SIGNIFICANT CHANGE UP (ref 150–400)
PLATELET # BLD AUTO: 258 K/UL — SIGNIFICANT CHANGE UP (ref 150–400)
PLATELET # BLD AUTO: 264 K/UL — SIGNIFICANT CHANGE UP (ref 150–400)
PLATELET # BLD AUTO: 266 K/UL — SIGNIFICANT CHANGE UP (ref 150–400)
PLATELET # BLD AUTO: 272 K/UL — SIGNIFICANT CHANGE UP (ref 150–400)
PLATELET # BLD AUTO: 284 K/UL — SIGNIFICANT CHANGE UP (ref 150–400)
PLATELET # BLD AUTO: 297 K/UL — SIGNIFICANT CHANGE UP (ref 150–400)
PO2 BLDV: 35 MMHG — SIGNIFICANT CHANGE UP
POTASSIUM BLDV-SCNC: 4.4 MMOL/L — SIGNIFICANT CHANGE UP (ref 3.5–5.1)
POTASSIUM SERPL-MCNC: 3.8 MMOL/L — SIGNIFICANT CHANGE UP (ref 3.5–5.3)
POTASSIUM SERPL-MCNC: 4 MMOL/L — SIGNIFICANT CHANGE UP (ref 3.5–5.3)
POTASSIUM SERPL-MCNC: 4.1 MMOL/L — SIGNIFICANT CHANGE UP (ref 3.5–5.3)
POTASSIUM SERPL-MCNC: 4.3 MMOL/L — SIGNIFICANT CHANGE UP (ref 3.5–5.3)
POTASSIUM SERPL-MCNC: 4.4 MMOL/L — SIGNIFICANT CHANGE UP (ref 3.5–5.3)
POTASSIUM SERPL-MCNC: 4.5 MMOL/L — SIGNIFICANT CHANGE UP (ref 3.5–5.3)
POTASSIUM SERPL-SCNC: 3.8 MMOL/L — SIGNIFICANT CHANGE UP (ref 3.5–5.3)
POTASSIUM SERPL-SCNC: 4 MMOL/L — SIGNIFICANT CHANGE UP (ref 3.5–5.3)
POTASSIUM SERPL-SCNC: 4.1 MMOL/L — SIGNIFICANT CHANGE UP (ref 3.5–5.3)
POTASSIUM SERPL-SCNC: 4.3 MMOL/L — SIGNIFICANT CHANGE UP (ref 3.5–5.3)
POTASSIUM SERPL-SCNC: 4.4 MMOL/L — SIGNIFICANT CHANGE UP (ref 3.5–5.3)
POTASSIUM SERPL-SCNC: 4.5 MMOL/L — SIGNIFICANT CHANGE UP (ref 3.5–5.3)
POTASSIUM SERPL-SCNC: 4.6 MMOL/L
POTASSIUM SERPL-SCNC: 5.2 MMOL/L
POTASSIUM UR-SCNC: 24.7 MMOL/L — SIGNIFICANT CHANGE UP
PROT SERPL-MCNC: 8.2 G/DL
PROT SERPL-MCNC: 8.2 G/DL — SIGNIFICANT CHANGE UP (ref 6–8.3)
PROT SERPL-MCNC: 8.9 G/DL
PROT SERPL-MCNC: 9 G/DL — HIGH (ref 6–8.3)
PROT UR-MCNC: ABNORMAL
PROT UR-MCNC: ABNORMAL
PROTEIN URINE: ABNORMAL
PROTHROM AB SERPL-ACNC: 11.2 SEC — SIGNIFICANT CHANGE UP (ref 10.5–13.4)
PROTHROM AB SERPL-ACNC: 11.4 SEC — SIGNIFICANT CHANGE UP (ref 10.5–13.4)
PROTHROM AB SERPL-ACNC: 11.6 SEC — SIGNIFICANT CHANGE UP (ref 10.5–13.4)
PROTHROM AB SERPL-ACNC: 12 SEC — SIGNIFICANT CHANGE UP (ref 10.5–13.4)
RBC # BLD: 3.75 M/UL — LOW (ref 3.8–5.2)
RBC # BLD: 3.81 M/UL — SIGNIFICANT CHANGE UP (ref 3.8–5.2)
RBC # BLD: 3.92 M/UL — SIGNIFICANT CHANGE UP (ref 3.8–5.2)
RBC # BLD: 3.99 M/UL — SIGNIFICANT CHANGE UP (ref 3.8–5.2)
RBC # BLD: 4 M/UL — SIGNIFICANT CHANGE UP (ref 3.8–5.2)
RBC # BLD: 4.01 M/UL — SIGNIFICANT CHANGE UP (ref 3.8–5.2)
RBC # BLD: 4.03 M/UL — SIGNIFICANT CHANGE UP (ref 3.8–5.2)
RBC # BLD: 4.26 M/UL — SIGNIFICANT CHANGE UP (ref 3.8–5.2)
RBC # BLD: 4.31 M/UL — SIGNIFICANT CHANGE UP (ref 3.8–5.2)
RBC # BLD: 4.59 M/UL — SIGNIFICANT CHANGE UP (ref 3.8–5.2)
RBC # FLD: 12.9 % — SIGNIFICANT CHANGE UP (ref 10.3–14.5)
RBC # FLD: 13.2 % — SIGNIFICANT CHANGE UP (ref 10.3–14.5)
RBC # FLD: 13.3 % — SIGNIFICANT CHANGE UP (ref 10.3–14.5)
RBC # FLD: 13.4 % — SIGNIFICANT CHANGE UP (ref 10.3–14.5)
RBC # FLD: 13.6 % — SIGNIFICANT CHANGE UP (ref 10.3–14.5)
RBC # FLD: 13.6 % — SIGNIFICANT CHANGE UP (ref 10.3–14.5)
RBC # FLD: 13.7 % — SIGNIFICANT CHANGE UP (ref 10.3–14.5)
RBC # FLD: 13.7 % — SIGNIFICANT CHANGE UP (ref 10.3–14.5)
RBC CASTS # UR COMP ASSIST: 3 /HPF — SIGNIFICANT CHANGE UP (ref 0–4)
RBC CASTS # UR COMP ASSIST: 5 /HPF — HIGH (ref 0–4)
RED BLOOD CELLS URINE: 4 /HPF
RH IG SCN BLD-IMP: NEGATIVE — SIGNIFICANT CHANGE UP
RH IG SCN BLD-IMP: NEGATIVE — SIGNIFICANT CHANGE UP
RSV RNA NPH QL NAA+NON-PROBE: SIGNIFICANT CHANGE UP
S AUREUS DNA NOSE QL NAA+PROBE: DETECTED
SAO2 % BLDV: 58.9 % — SIGNIFICANT CHANGE UP
SARS-COV-2 RNA SPEC QL NAA+PROBE: SIGNIFICANT CHANGE UP
SEROTONIN SERUM: 134 NG/ML
SODIUM SERPL-SCNC: 133 MMOL/L — LOW (ref 135–145)
SODIUM SERPL-SCNC: 135 MMOL/L — SIGNIFICANT CHANGE UP (ref 135–145)
SODIUM SERPL-SCNC: 136 MMOL/L — SIGNIFICANT CHANGE UP (ref 135–145)
SODIUM SERPL-SCNC: 137 MMOL/L
SODIUM SERPL-SCNC: 140 MMOL/L
SODIUM UR-SCNC: 54 MMOL/L — SIGNIFICANT CHANGE UP
SP GR SPEC: 1.01 — SIGNIFICANT CHANGE UP (ref 1.01–1.05)
SP GR SPEC: 1.03 — SIGNIFICANT CHANGE UP (ref 1.01–1.05)
SPECIFIC GRAVITY URINE: 1.01
SPECIMEN SOURCE: SIGNIFICANT CHANGE UP
SQUAMOUS EPITHELIAL CELLS: 0 /HPF
URINE COMMENTS: NORMAL
UROBILINOGEN FLD QL: SIGNIFICANT CHANGE UP
UROBILINOGEN FLD QL: SIGNIFICANT CHANGE UP
UROBILINOGEN URINE: NORMAL
WBC # BLD: 10.31 K/UL — SIGNIFICANT CHANGE UP (ref 3.8–10.5)
WBC # BLD: 10.4 K/UL — SIGNIFICANT CHANGE UP (ref 3.8–10.5)
WBC # BLD: 10.61 K/UL — HIGH (ref 3.8–10.5)
WBC # BLD: 11.06 K/UL — HIGH (ref 3.8–10.5)
WBC # BLD: 11.4 K/UL — HIGH (ref 3.8–10.5)
WBC # BLD: 11.78 K/UL — HIGH (ref 3.8–10.5)
WBC # BLD: 14.22 K/UL — HIGH (ref 3.8–10.5)
WBC # BLD: 14.7 K/UL — HIGH (ref 3.8–10.5)
WBC # BLD: 8.39 K/UL — SIGNIFICANT CHANGE UP (ref 3.8–10.5)
WBC # BLD: 9.51 K/UL — SIGNIFICANT CHANGE UP (ref 3.8–10.5)
WBC # FLD AUTO: 10.31 K/UL — SIGNIFICANT CHANGE UP (ref 3.8–10.5)
WBC # FLD AUTO: 10.4 K/UL — SIGNIFICANT CHANGE UP (ref 3.8–10.5)
WBC # FLD AUTO: 10.61 K/UL — HIGH (ref 3.8–10.5)
WBC # FLD AUTO: 11.06 K/UL — HIGH (ref 3.8–10.5)
WBC # FLD AUTO: 11.4 K/UL — HIGH (ref 3.8–10.5)
WBC # FLD AUTO: 11.78 K/UL — HIGH (ref 3.8–10.5)
WBC # FLD AUTO: 14.22 K/UL — HIGH (ref 3.8–10.5)
WBC # FLD AUTO: 14.7 K/UL — HIGH (ref 3.8–10.5)
WBC # FLD AUTO: 8.39 K/UL — SIGNIFICANT CHANGE UP (ref 3.8–10.5)
WBC # FLD AUTO: 9.51 K/UL — SIGNIFICANT CHANGE UP (ref 3.8–10.5)
WBC UR QL: 14 /HPF — HIGH (ref 0–5)
WBC UR QL: >720 /HPF — HIGH (ref 0–5)
WHITE BLOOD CELLS URINE: 125 /HPF

## 2022-01-01 PROCEDURE — 71045 X-RAY EXAM CHEST 1 VIEW: CPT | Mod: 26

## 2022-01-01 PROCEDURE — 71260 CT THORAX DX C+: CPT

## 2022-01-01 PROCEDURE — 70450 CT HEAD/BRAIN W/O DYE: CPT | Mod: 26,MA

## 2022-01-01 PROCEDURE — 99233 SBSQ HOSP IP/OBS HIGH 50: CPT | Mod: GC

## 2022-01-01 PROCEDURE — 99221 1ST HOSP IP/OBS SF/LOW 40: CPT

## 2022-01-01 PROCEDURE — 99223 1ST HOSP IP/OBS HIGH 75: CPT | Mod: 25,GC

## 2022-01-01 PROCEDURE — 99222 1ST HOSP IP/OBS MODERATE 55: CPT

## 2022-01-01 PROCEDURE — 31627 NAVIGATIONAL BRONCHOSCOPY: CPT | Mod: GC

## 2022-01-01 PROCEDURE — 88112 CYTOPATH CELL ENHANCE TECH: CPT | Mod: 26,59

## 2022-01-01 PROCEDURE — 77387B: CUSTOM | Mod: 26

## 2022-01-01 PROCEDURE — 99232 SBSQ HOSP IP/OBS MODERATE 35: CPT | Mod: GC

## 2022-01-01 PROCEDURE — 31654 BRONCH EBUS IVNTJ PERPH LES: CPT | Mod: GC

## 2022-01-01 PROCEDURE — 88342 IMHCHEM/IMCYTCHM 1ST ANTB: CPT | Mod: 26

## 2022-01-01 PROCEDURE — 99232 SBSQ HOSP IP/OBS MODERATE 35: CPT

## 2022-01-01 PROCEDURE — 99495 TRANSJ CARE MGMT MOD F2F 14D: CPT

## 2022-01-01 PROCEDURE — 77427 RADIATION TX MANAGEMENT X5: CPT

## 2022-01-01 PROCEDURE — 99406 BEHAV CHNG SMOKING 3-10 MIN: CPT

## 2022-01-01 PROCEDURE — 99285 EMERGENCY DEPT VISIT HI MDM: CPT

## 2022-01-01 PROCEDURE — 72197 MRI PELVIS W/O & W/DYE: CPT | Mod: 26

## 2022-01-01 PROCEDURE — 31645 BRNCHSC W/THER ASPIR 1ST: CPT | Mod: GC

## 2022-01-01 PROCEDURE — 99239 HOSP IP/OBS DSCHRG MGMT >30: CPT

## 2022-01-01 PROCEDURE — 77014: CPT | Mod: 26

## 2022-01-01 PROCEDURE — 77011 CT SCAN FOR LOCALIZATION: CPT | Mod: 26

## 2022-01-01 PROCEDURE — 77300 RADIATION THERAPY DOSE PLAN: CPT | Mod: 26

## 2022-01-01 PROCEDURE — 31624 DX BRONCHOSCOPE/LAVAGE: CPT | Mod: GC

## 2022-01-01 PROCEDURE — 77338 DESIGN MLC DEVICE FOR IMRT: CPT | Mod: 26

## 2022-01-01 PROCEDURE — 77290 THER RAD SIMULAJ FIELD CPLX: CPT | Mod: 26

## 2022-01-01 PROCEDURE — 31628 BRONCHOSCOPY/LUNG BX EACH: CPT | Mod: GC

## 2022-01-01 PROCEDURE — 77263 THER RADIOLOGY TX PLNG CPLX: CPT

## 2022-01-01 PROCEDURE — 71260 CT THORAX DX C+: CPT | Mod: 26,MH

## 2022-01-01 PROCEDURE — 77301 RADIOTHERAPY DOSE PLAN IMRT: CPT | Mod: 26

## 2022-01-01 PROCEDURE — A9585: CPT | Mod: JW

## 2022-01-01 PROCEDURE — 78815 PET IMAGE W/CT SKULL-THIGH: CPT | Mod: 26,PI,MH

## 2022-01-01 PROCEDURE — 76856 US EXAM PELVIC COMPLETE: CPT | Mod: 26

## 2022-01-01 PROCEDURE — 99223 1ST HOSP IP/OBS HIGH 75: CPT | Mod: FS

## 2022-01-01 PROCEDURE — G0438: CPT

## 2022-01-01 PROCEDURE — 99443: CPT | Mod: 95

## 2022-01-01 PROCEDURE — 99223 1ST HOSP IP/OBS HIGH 75: CPT

## 2022-01-01 PROCEDURE — 99205 OFFICE O/P NEW HI 60 MIN: CPT

## 2022-01-01 PROCEDURE — 88173 CYTOPATH EVAL FNA REPORT: CPT | Mod: 26

## 2022-01-01 PROCEDURE — 99215 OFFICE O/P EST HI 40 MIN: CPT

## 2022-01-01 PROCEDURE — A9552: CPT

## 2022-01-01 PROCEDURE — 99232 SBSQ HOSP IP/OBS MODERATE 35: CPT | Mod: FS

## 2022-01-01 PROCEDURE — 99214 OFFICE O/P EST MOD 30 MIN: CPT | Mod: 25

## 2022-01-01 PROCEDURE — 93000 ELECTROCARDIOGRAM COMPLETE: CPT

## 2022-01-01 PROCEDURE — 99442: CPT | Mod: 95

## 2022-01-01 PROCEDURE — 70553 MRI BRAIN STEM W/O & W/DYE: CPT

## 2022-01-01 PROCEDURE — 78815 PET IMAGE W/CT SKULL-THIGH: CPT

## 2022-01-01 PROCEDURE — 99233 SBSQ HOSP IP/OBS HIGH 50: CPT | Mod: GC,25

## 2022-01-01 PROCEDURE — 74177 CT ABD & PELVIS W/CONTRAST: CPT | Mod: 26,MH

## 2022-01-01 PROCEDURE — 99284 EMERGENCY DEPT VISIT MOD MDM: CPT

## 2022-01-01 PROCEDURE — 74177 CT ABD & PELVIS W/CONTRAST: CPT

## 2022-01-01 PROCEDURE — 36415 COLL VENOUS BLD VENIPUNCTURE: CPT

## 2022-01-01 PROCEDURE — 88341 IMHCHEM/IMCYTCHM EA ADD ANTB: CPT | Mod: 26

## 2022-01-01 PROCEDURE — 88305 TISSUE EXAM BY PATHOLOGIST: CPT | Mod: 26

## 2022-01-01 PROCEDURE — 99497 ADVNCD CARE PLAN 30 MIN: CPT | Mod: 25

## 2022-01-01 PROCEDURE — ZZZZZ: CPT

## 2022-01-01 PROCEDURE — 77334 RADIATION TREATMENT AID(S): CPT | Mod: 26

## 2022-01-01 PROCEDURE — 74177 CT ABD & PELVIS W/CONTRAST: CPT | Mod: 26,MA

## 2022-01-01 PROCEDURE — 31629 BRONCHOSCOPY/NEEDLE BX EACH: CPT | Mod: GC

## 2022-01-01 RX ORDER — INFLUENZA VIRUS VACCINE 15; 15; 15; 15 UG/.5ML; UG/.5ML; UG/.5ML; UG/.5ML
0.7 SUSPENSION INTRAMUSCULAR ONCE
Refills: 0 | Status: DISCONTINUED | OUTPATIENT
Start: 2022-01-01 | End: 2022-01-01

## 2022-01-01 RX ORDER — ACETAMINOPHEN 500 MG
650 TABLET ORAL EVERY 6 HOURS
Refills: 0 | Status: DISCONTINUED | OUTPATIENT
Start: 2022-01-01 | End: 2022-01-01

## 2022-01-01 RX ORDER — ALPRAZOLAM 0.25 MG
0.25 TABLET ORAL ONCE
Refills: 0 | Status: DISCONTINUED | OUTPATIENT
Start: 2022-01-01 | End: 2022-01-01

## 2022-01-01 RX ORDER — HEPARIN SODIUM 5000 [USP'U]/ML
5000 INJECTION INTRAVENOUS; SUBCUTANEOUS EVERY 8 HOURS
Refills: 0 | Status: DISCONTINUED | OUTPATIENT
Start: 2022-01-01 | End: 2022-01-01

## 2022-01-01 RX ORDER — LANOLIN ALCOHOL/MO/W.PET/CERES
3 CREAM (GRAM) TOPICAL AT BEDTIME
Refills: 0 | Status: DISCONTINUED | OUTPATIENT
Start: 2022-01-01 | End: 2022-01-01

## 2022-01-01 RX ORDER — SODIUM CHLORIDE 9 MG/ML
1000 INJECTION, SOLUTION INTRAVENOUS
Refills: 0 | Status: DISCONTINUED | OUTPATIENT
Start: 2022-01-01 | End: 2022-01-01

## 2022-01-01 RX ORDER — SODIUM CHLORIDE 9 MG/ML
1000 INJECTION INTRAMUSCULAR; INTRAVENOUS; SUBCUTANEOUS ONCE
Refills: 0 | Status: COMPLETED | OUTPATIENT
Start: 2022-01-01 | End: 2022-01-01

## 2022-01-01 RX ORDER — LEVETIRACETAM 250 MG/1
1 TABLET, FILM COATED ORAL
Qty: 0 | Refills: 0 | DISCHARGE
Start: 2022-01-01

## 2022-01-01 RX ORDER — LORAZEPAM 0.5 MG/1
0.5 TABLET ORAL
Qty: 15 | Refills: 0 | Status: ACTIVE | COMMUNITY
Start: 2022-01-01 | End: 1900-01-01

## 2022-01-01 RX ORDER — ALPRAZOLAM 0.25 MG
0.5 TABLET ORAL EVERY 8 HOURS
Refills: 0 | Status: DISCONTINUED | OUTPATIENT
Start: 2022-01-01 | End: 2022-01-01

## 2022-01-01 RX ORDER — MORPHINE SULFATE 50 MG/1
2 CAPSULE, EXTENDED RELEASE ORAL EVERY 24 HOURS
Refills: 0 | Status: DISCONTINUED | OUTPATIENT
Start: 2022-01-01 | End: 2022-01-01

## 2022-01-01 RX ORDER — LEVETIRACETAM 250 MG/1
1 TABLET, FILM COATED ORAL
Qty: 180 | Refills: 0
Start: 2022-01-01 | End: 2023-01-01

## 2022-01-01 RX ORDER — MUPIROCIN 20 MG/G
1 OINTMENT TOPICAL
Refills: 0 | Status: DISCONTINUED | OUTPATIENT
Start: 2022-01-01 | End: 2022-01-01

## 2022-01-01 RX ORDER — ONDANSETRON 8 MG/1
4 TABLET, FILM COATED ORAL ONCE
Refills: 0 | Status: COMPLETED | OUTPATIENT
Start: 2022-01-01 | End: 2022-01-01

## 2022-01-01 RX ORDER — MAGNESIUM HYDROXIDE 400 MG/1
30 TABLET, CHEWABLE ORAL ONCE
Refills: 0 | Status: COMPLETED | OUTPATIENT
Start: 2022-01-01 | End: 2022-01-01

## 2022-01-01 RX ORDER — ACETAMINOPHEN 500 MG
725 TABLET ORAL ONCE
Refills: 0 | Status: COMPLETED | OUTPATIENT
Start: 2022-01-01 | End: 2022-01-01

## 2022-01-01 RX ORDER — CEFTRIAXONE 500 MG/1
1000 INJECTION, POWDER, FOR SOLUTION INTRAMUSCULAR; INTRAVENOUS EVERY 24 HOURS
Refills: 0 | Status: COMPLETED | OUTPATIENT
Start: 2022-01-01 | End: 2022-01-01

## 2022-01-01 RX ORDER — OXYCODONE HYDROCHLORIDE 5 MG/1
2.5 TABLET ORAL EVERY 6 HOURS
Refills: 0 | Status: DISCONTINUED | OUTPATIENT
Start: 2022-01-01 | End: 2022-01-01

## 2022-01-01 RX ORDER — NITROFURANTOIN MACROCRYSTAL 50 MG
1 CAPSULE ORAL
Qty: 14 | Refills: 0
Start: 2022-01-01 | End: 2022-01-01

## 2022-01-01 RX ORDER — SODIUM CHLORIDE 9 MG/ML
500 INJECTION, SOLUTION INTRAVENOUS ONCE
Refills: 0 | Status: COMPLETED | OUTPATIENT
Start: 2022-01-01 | End: 2022-01-01

## 2022-01-01 RX ORDER — AMLODIPINE BESYLATE 2.5 MG/1
2.5 TABLET ORAL DAILY
Qty: 90 | Refills: 1 | Status: DISCONTINUED | COMMUNITY
Start: 2019-04-29 | End: 2022-01-01

## 2022-01-01 RX ORDER — ENOXAPARIN SODIUM 100 MG/ML
30 INJECTION SUBCUTANEOUS EVERY 24 HOURS
Refills: 0 | Status: DISCONTINUED | OUTPATIENT
Start: 2022-01-01 | End: 2022-01-01

## 2022-01-01 RX ORDER — ALPRAZOLAM 0.25 MG/1
0.25 TABLET ORAL
Refills: 0 | Status: DISCONTINUED | COMMUNITY
End: 2022-01-01

## 2022-01-01 RX ORDER — ONDANSETRON 8 MG/1
1 TABLET, FILM COATED ORAL
Qty: 9 | Refills: 0
Start: 2022-01-01 | End: 2022-01-01

## 2022-01-01 RX ORDER — SENNA PLUS 8.6 MG/1
2 TABLET ORAL AT BEDTIME
Refills: 0 | Status: DISCONTINUED | OUTPATIENT
Start: 2022-01-01 | End: 2022-01-01

## 2022-01-01 RX ORDER — AMLODIPINE BESYLATE 2.5 MG/1
2.5 TABLET ORAL
Qty: 90 | Refills: 1 | Status: DISCONTINUED | COMMUNITY
Start: 2019-05-22 | End: 2022-01-01

## 2022-01-01 RX ORDER — MAGNESIUM HYDROXIDE 400 MG/1
30 TABLET, CHEWABLE ORAL DAILY
Refills: 0 | Status: DISCONTINUED | OUTPATIENT
Start: 2022-01-01 | End: 2022-01-01

## 2022-01-01 RX ORDER — POLYETHYLENE GLYCOL 3350 17 G/17G
17 POWDER, FOR SOLUTION ORAL
Refills: 0 | Status: DISCONTINUED | OUTPATIENT
Start: 2022-01-01 | End: 2022-01-01

## 2022-01-01 RX ORDER — DIPHENHYDRAMINE HCL, IBUPROFEN 25; 200 MG/1; MG/1
CAPSULE, LIQUID FILLED ORAL
Refills: 0 | Status: ACTIVE | COMMUNITY

## 2022-01-01 RX ORDER — ENOXAPARIN SODIUM 100 MG/ML
30 INJECTION SUBCUTANEOUS
Refills: 0 | Status: DISCONTINUED | OUTPATIENT
Start: 2022-01-01 | End: 2022-01-01

## 2022-01-01 RX ORDER — ASCORBIC ACID/MULTIVIT-MIN 1000 MG
EFFERVESCENT POWDER IN PACKET ORAL
Refills: 0 | Status: ACTIVE | COMMUNITY

## 2022-01-01 RX ORDER — LEVETIRACETAM 500 MG/1
500 TABLET, FILM COATED ORAL
Qty: 180 | Refills: 0 | Status: ACTIVE | COMMUNITY
Start: 2022-01-01

## 2022-01-01 RX ORDER — LORAZEPAM 0.5 MG/1
0.5 TABLET ORAL
Qty: 15 | Refills: 0 | Status: COMPLETED | COMMUNITY
Start: 2019-06-12 | End: 2022-01-01

## 2022-01-01 RX ORDER — TRAMADOL HYDROCHLORIDE 50 MG/1
50 TABLET, COATED ORAL
Qty: 21 | Refills: 0 | Status: DISCONTINUED | COMMUNITY
Start: 2022-01-01 | End: 2022-01-01

## 2022-01-01 RX ORDER — POLYETHYLENE GLYCOL 3350 17 G/17G
17 POWDER, FOR SOLUTION ORAL DAILY
Refills: 0 | Status: DISCONTINUED | OUTPATIENT
Start: 2022-01-01 | End: 2022-01-01

## 2022-01-01 RX ORDER — CHLORHEXIDINE GLUCONATE 213 G/1000ML
1 SOLUTION TOPICAL ONCE
Refills: 0 | Status: DISCONTINUED | OUTPATIENT
Start: 2022-01-01 | End: 2022-01-01

## 2022-01-01 RX ORDER — ALPRAZOLAM 0.25 MG/1
0.25 TABLET ORAL
Qty: 20 | Refills: 0 | Status: ACTIVE | COMMUNITY
Start: 2022-01-01 | End: 1900-01-01

## 2022-01-01 RX ORDER — LEVETIRACETAM 250 MG/1
500 TABLET, FILM COATED ORAL ONCE
Refills: 0 | Status: COMPLETED | OUTPATIENT
Start: 2022-01-01 | End: 2022-01-01

## 2022-01-01 RX ORDER — OXYCODONE AND ACETAMINOPHEN 7.5; 325 MG/1; MG/1
7.5-325 TABLET ORAL
Qty: 50 | Refills: 0 | Status: ACTIVE | COMMUNITY
Start: 2022-01-01 | End: 1900-01-01

## 2022-01-01 RX ORDER — LEVETIRACETAM 250 MG/1
500 TABLET, FILM COATED ORAL
Refills: 0 | Status: DISCONTINUED | OUTPATIENT
Start: 2022-01-01 | End: 2022-01-01

## 2022-01-01 RX ADMIN — Medication 0.25 MILLIGRAM(S): at 23:34

## 2022-01-01 RX ADMIN — LEVETIRACETAM 500 MILLIGRAM(S): 250 TABLET, FILM COATED ORAL at 17:42

## 2022-01-01 RX ADMIN — Medication 650 MILLIGRAM(S): at 04:21

## 2022-01-01 RX ADMIN — Medication 650 MILLIGRAM(S): at 22:33

## 2022-01-01 RX ADMIN — Medication 3 MILLIGRAM(S): at 21:12

## 2022-01-01 RX ADMIN — Medication 0.5 MILLIGRAM(S): at 13:49

## 2022-01-01 RX ADMIN — Medication 650 MILLIGRAM(S): at 09:23

## 2022-01-01 RX ADMIN — SENNA PLUS 2 TABLET(S): 8.6 TABLET ORAL at 21:12

## 2022-01-01 RX ADMIN — POLYETHYLENE GLYCOL 3350 17 GRAM(S): 17 POWDER, FOR SOLUTION ORAL at 17:06

## 2022-01-01 RX ADMIN — HEPARIN SODIUM 5000 UNIT(S): 5000 INJECTION INTRAVENOUS; SUBCUTANEOUS at 06:49

## 2022-01-01 RX ADMIN — Medication 650 MILLIGRAM(S): at 12:45

## 2022-01-01 RX ADMIN — ONDANSETRON 4 MILLIGRAM(S): 8 TABLET, FILM COATED ORAL at 23:55

## 2022-01-01 RX ADMIN — LEVETIRACETAM 400 MILLIGRAM(S): 250 TABLET, FILM COATED ORAL at 23:09

## 2022-01-01 RX ADMIN — Medication 650 MILLIGRAM(S): at 13:45

## 2022-01-01 RX ADMIN — MUPIROCIN 1 APPLICATION(S): 20 OINTMENT TOPICAL at 17:07

## 2022-01-01 RX ADMIN — Medication 650 MILLIGRAM(S): at 10:27

## 2022-01-01 RX ADMIN — HEPARIN SODIUM 5000 UNIT(S): 5000 INJECTION INTRAVENOUS; SUBCUTANEOUS at 05:20

## 2022-01-01 RX ADMIN — Medication 650 MILLIGRAM(S): at 11:17

## 2022-01-01 RX ADMIN — POLYETHYLENE GLYCOL 3350 17 GRAM(S): 17 POWDER, FOR SOLUTION ORAL at 17:43

## 2022-01-01 RX ADMIN — Medication 650 MILLIGRAM(S): at 08:23

## 2022-01-01 RX ADMIN — LEVETIRACETAM 500 MILLIGRAM(S): 250 TABLET, FILM COATED ORAL at 06:46

## 2022-01-01 RX ADMIN — Medication 0.25 MILLIGRAM(S): at 13:15

## 2022-01-01 RX ADMIN — OXYCODONE HYDROCHLORIDE 2.5 MILLIGRAM(S): 5 TABLET ORAL at 20:45

## 2022-01-01 RX ADMIN — Medication 3 MILLIGRAM(S): at 21:42

## 2022-01-01 RX ADMIN — Medication 3 MILLIGRAM(S): at 21:15

## 2022-01-01 RX ADMIN — OXYCODONE HYDROCHLORIDE 2.5 MILLIGRAM(S): 5 TABLET ORAL at 19:49

## 2022-01-01 RX ADMIN — SENNA PLUS 2 TABLET(S): 8.6 TABLET ORAL at 21:42

## 2022-01-01 RX ADMIN — Medication 650 MILLIGRAM(S): at 21:46

## 2022-01-01 RX ADMIN — HEPARIN SODIUM 5000 UNIT(S): 5000 INJECTION INTRAVENOUS; SUBCUTANEOUS at 22:33

## 2022-01-01 RX ADMIN — LEVETIRACETAM 500 MILLIGRAM(S): 250 TABLET, FILM COATED ORAL at 17:23

## 2022-01-01 RX ADMIN — LEVETIRACETAM 500 MILLIGRAM(S): 250 TABLET, FILM COATED ORAL at 17:06

## 2022-01-01 RX ADMIN — ONDANSETRON 4 MILLIGRAM(S): 8 TABLET, FILM COATED ORAL at 20:13

## 2022-01-01 RX ADMIN — Medication 3 MILLIGRAM(S): at 21:46

## 2022-01-01 RX ADMIN — HEPARIN SODIUM 5000 UNIT(S): 5000 INJECTION INTRAVENOUS; SUBCUTANEOUS at 06:46

## 2022-01-01 RX ADMIN — LEVETIRACETAM 500 MILLIGRAM(S): 250 TABLET, FILM COATED ORAL at 05:20

## 2022-01-01 RX ADMIN — Medication 0.5 MILLIGRAM(S): at 21:14

## 2022-01-01 RX ADMIN — SODIUM CHLORIDE 1000 MILLILITER(S): 9 INJECTION INTRAMUSCULAR; INTRAVENOUS; SUBCUTANEOUS at 23:55

## 2022-01-01 RX ADMIN — SENNA PLUS 2 TABLET(S): 8.6 TABLET ORAL at 21:14

## 2022-01-01 RX ADMIN — Medication 650 MILLIGRAM(S): at 03:21

## 2022-01-01 RX ADMIN — SODIUM CHLORIDE 1000 MILLILITER(S): 9 INJECTION INTRAMUSCULAR; INTRAVENOUS; SUBCUTANEOUS at 20:04

## 2022-01-01 RX ADMIN — SODIUM CHLORIDE 1000 MILLILITER(S): 9 INJECTION, SOLUTION INTRAVENOUS at 07:32

## 2022-01-01 RX ADMIN — HEPARIN SODIUM 5000 UNIT(S): 5000 INJECTION INTRAVENOUS; SUBCUTANEOUS at 13:53

## 2022-01-01 RX ADMIN — CEFTRIAXONE 100 MILLIGRAM(S): 500 INJECTION, POWDER, FOR SOLUTION INTRAMUSCULAR; INTRAVENOUS at 14:34

## 2022-01-01 RX ADMIN — MUPIROCIN 1 APPLICATION(S): 20 OINTMENT TOPICAL at 19:43

## 2022-01-01 RX ADMIN — CEFTRIAXONE 100 MILLIGRAM(S): 500 INJECTION, POWDER, FOR SOLUTION INTRAMUSCULAR; INTRAVENOUS at 14:43

## 2022-01-01 RX ADMIN — MAGNESIUM HYDROXIDE 30 MILLILITER(S): 400 TABLET, CHEWABLE ORAL at 19:29

## 2022-01-01 RX ADMIN — Medication 0.5 MILLIGRAM(S): at 21:41

## 2022-01-01 RX ADMIN — Medication 0.25 MILLIGRAM(S): at 09:40

## 2022-01-01 RX ADMIN — MAGNESIUM HYDROXIDE 30 MILLILITER(S): 400 TABLET, CHEWABLE ORAL at 14:34

## 2022-01-01 RX ADMIN — Medication 0.5 MILLIGRAM(S): at 11:40

## 2022-01-01 RX ADMIN — HEPARIN SODIUM 5000 UNIT(S): 5000 INJECTION INTRAVENOUS; SUBCUTANEOUS at 14:47

## 2022-01-01 RX ADMIN — Medication 650 MILLIGRAM(S): at 16:14

## 2022-01-01 RX ADMIN — Medication 0.5 MILLIGRAM(S): at 10:06

## 2022-01-01 RX ADMIN — Medication 650 MILLIGRAM(S): at 15:44

## 2022-01-01 RX ADMIN — LEVETIRACETAM 500 MILLIGRAM(S): 250 TABLET, FILM COATED ORAL at 17:02

## 2022-01-01 RX ADMIN — MUPIROCIN 1 APPLICATION(S): 20 OINTMENT TOPICAL at 05:20

## 2022-01-01 RX ADMIN — Medication 0.5 MILLIGRAM(S): at 13:52

## 2022-01-01 RX ADMIN — LEVETIRACETAM 500 MILLIGRAM(S): 250 TABLET, FILM COATED ORAL at 17:45

## 2022-01-01 RX ADMIN — CEFTRIAXONE 100 MILLIGRAM(S): 500 INJECTION, POWDER, FOR SOLUTION INTRAMUSCULAR; INTRAVENOUS at 14:42

## 2022-01-01 RX ADMIN — HEPARIN SODIUM 5000 UNIT(S): 5000 INJECTION INTRAVENOUS; SUBCUTANEOUS at 21:13

## 2022-01-01 RX ADMIN — LEVETIRACETAM 500 MILLIGRAM(S): 250 TABLET, FILM COATED ORAL at 06:30

## 2022-01-01 RX ADMIN — Medication 650 MILLIGRAM(S): at 23:30

## 2022-01-01 RX ADMIN — Medication 3 MILLIGRAM(S): at 22:34

## 2022-01-01 RX ADMIN — LEVETIRACETAM 500 MILLIGRAM(S): 250 TABLET, FILM COATED ORAL at 05:51

## 2022-01-01 RX ADMIN — Medication 0.5 MILLIGRAM(S): at 13:44

## 2022-01-01 RX ADMIN — MUPIROCIN 1 APPLICATION(S): 20 OINTMENT TOPICAL at 18:11

## 2022-01-01 RX ADMIN — OXYCODONE HYDROCHLORIDE 2.5 MILLIGRAM(S): 5 TABLET ORAL at 12:04

## 2022-01-01 RX ADMIN — Medication 650 MILLIGRAM(S): at 22:46

## 2022-01-01 RX ADMIN — MUPIROCIN 1 APPLICATION(S): 20 OINTMENT TOPICAL at 17:03

## 2022-01-01 RX ADMIN — MUPIROCIN 1 APPLICATION(S): 20 OINTMENT TOPICAL at 06:31

## 2022-01-01 RX ADMIN — Medication 650 MILLIGRAM(S): at 19:32

## 2022-01-01 RX ADMIN — Medication 290 MILLIGRAM(S): at 20:36

## 2022-01-01 RX ADMIN — Medication 650 MILLIGRAM(S): at 20:32

## 2022-01-01 RX ADMIN — HEPARIN SODIUM 5000 UNIT(S): 5000 INJECTION INTRAVENOUS; SUBCUTANEOUS at 21:15

## 2022-01-01 RX ADMIN — MUPIROCIN 1 APPLICATION(S): 20 OINTMENT TOPICAL at 06:46

## 2022-01-01 RX ADMIN — MUPIROCIN 1 APPLICATION(S): 20 OINTMENT TOPICAL at 17:44

## 2022-01-01 RX ADMIN — Medication 650 MILLIGRAM(S): at 22:15

## 2022-01-01 RX ADMIN — OXYCODONE HYDROCHLORIDE 2.5 MILLIGRAM(S): 5 TABLET ORAL at 12:44

## 2022-01-01 RX ADMIN — Medication 0.5 MILLIGRAM(S): at 19:59

## 2022-01-01 RX ADMIN — Medication 650 MILLIGRAM(S): at 21:15

## 2022-05-05 PROBLEM — E78.5 HYPERLIPIDEMIA: Status: ACTIVE | Noted: 2019-05-22

## 2022-05-05 NOTE — COUNSELING
[Yes] : Risk of tobacco use and health benefits of smoking cessation discussed: Yes [No] : Not willing to quit smoking [FreeTextEntry1] : 3

## 2022-05-05 NOTE — HEALTH RISK ASSESSMENT
[With Family] : lives with family [With Patient/Caregiver] : , with patient/caregiver [AdvancecareDate] : 05/22

## 2022-05-05 NOTE — HISTORY OF PRESENT ILLNESS
[Family Member] : family member [FreeTextEntry1] : cpx\par seen with mykel\par no recent gen medical care [de-identified] : cpx\par saw derm for scalp irritation--given topical steroid which she didn’t tolerate--went to [facility]\par no meds\par pt with generalized anxiety with episodic flare--poor sleep\par no interest in tobacco cessation\par labs reviewed from 4/22  visit\par 3x covid vacced

## 2022-05-05 NOTE — ASSESSMENT
[FreeTextEntry1] : check labs/ecg\par defers standing mood stabilizer--prn benzo for occ use only--d/w pt and niece

## 2022-10-02 NOTE — ASSESSMENT
[Palliative] : Goals of care discussed with patient: Palliative [Palliative Care Plan] : not applicable at this time [FreeTextEntry1] : A discussion took place with the patient and her daughter and niece regarding further management.  At the present time the patient has evidence of a 4 cm mass in the posterior scalp status postbiopsy which on pathology reveals adenocarcinoma with mucinous features.  The patient underwent immunostains showing CK7 positive and pan CK positive with negative p63.  Other stains including CK20, CDX2, GATA3, ER and FL receptors were all negative.  The patient is being classified as a adenocarcinoma of unknown primary although the suggestion is that this tumor is arising from the upper GI source..  Currently the patient has no signs or symptoms from history or examination indicating the source of the primary lesion.\par \par We discussed at length with the patient and her family members the need for further work-up including PET/CT and possible other studies including CAT scan and MRI depending on the results.  The patient initially was resistant to all imaging testing but now is agreeable to undergo PET/CT in attempt to establish a primary lesion.  Upper and lower endoscopies depending on the results of the scan.  We are also performing blood test with markers to assess for the primary source.  Depending on the results it is not clear whether the patient will agree to undergo therapy which will be discussed after we have more information. \par \par  The patient's sister was treated by us with a diagnosis of breast cancer 11 years ago.  The patient has been resistant to colonoscopy and mammography in the past.  The patient is currently only interested in having the lesion removed as it is affecting her quality of life making it difficult to lay on her side of the head causing difficulty with sleeping.  As result she was referred to Dr.Manny Horta for surgical consultation, resection and evaluation for scalp reconstruction.

## 2022-10-02 NOTE — CONSULT LETTER
[Dear  ___] : Dear  [unfilled], [Consult Letter:] : I had the pleasure of evaluating your patient, [unfilled]. [Please see my note below.] : Please see my note below. [Consult Closing:] : Thank you very much for allowing me to participate in the care of this patient.  If you have any questions, please do not hesitate to contact me. [Sincerely,] : Sincerely, [FreeTextEntry2] : Dr. Alexander Altamirano [FreeTextEntry3] : [facility]\par [facility]\par [facility] [DrSandy  ___] : Dr. GREER

## 2022-10-02 NOTE — PHYSICAL EXAM
[Restricted in physically strenuous activity but ambulatory and able to carry out work of a light or sedentary nature] : Status 1- Restricted in physically strenuous activity but ambulatory and able to carry out work of a light or sedentary nature, e.g., light house work, office work [Thin] : thin [Normal] : normoactive bowel sounds, soft and nontender, no hepatosplenomegaly or masses appreciated [de-identified] : +Kyphosis,

## 2022-10-02 NOTE — REASON FOR VISIT
[Initial Consultation] : an initial consultation [Family Member] : family member [Other: _____] : [unfilled] [FreeTextEntry2] : Adenocarcinoma of Unknown primary

## 2022-10-02 NOTE — HISTORY OF PRESENT ILLNESS
[de-identified] : 81 yo female with PMHX Anxiety disorder, Hyperlipidemia, Kidney stones comes for consultation of Adenocarcinoma of a scalp lesion of unknown possible upper Gi source. As per patient, she discovered the scalp lesion 5 months ago. The lesion disturbs her sleep and causes her scalp to be very itchy. She was evaluated by a Dermatologist and she was prescribed a steroid cream which did not help. She was seen by a surgeon who believed that it was a cyst. He attempted to remove it in his office but found it was too vacular. A tissue biopsy of the scalp lesion was performed which came back as: Metastatic Adenocarcinoma. Patient is denying any other symptoms. She has no weakness, fatigue, weight loss, fever, night sweats, abdominal pain, diarrhea, constipation, melena, or bloody stools. Appetite is good. Patient has never had a colonoscopy, mammogram or Pap smear. \par \par Patient is a  and she lives alone. She never had any natural children, but she has two well adult adopted children, She is a smoker who smokes 1 PPD x 55 years ,and she drinks alcohol socially. Patient is a retired  for [employer]. She has positive family history of cancer. Her sister (twin) had breast cancer at age 61 years old. Her 1st cousin had Bladder cancer at age 65 years old. \par \par 08/31/2022, Dr. Archie Sutherland performed biopsy of scalp lesion. Pathology report of that day was as follows: A. Scalp, scalp- Metastatic Adenocarcinoma with Mucinous Differentiation. Comment: Phenotypic studies support possible of upper gastrointestinal origin, Systemic work-up is recommended.

## 2022-10-25 NOTE — ED PROVIDER NOTE - OBJECTIVE STATEMENT
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 pt had biopsy of scalp lesion a few weeks ago and was found to have metastatic adenoca, had CTs, MRI PEt scan last week and found ot have primary lung ca, pt was sent in for admission for biopsy and neuro eval, currently has no complaints other than naxiety, due for her home xanax 81yo F recently found to have metastatic lung ca to bone and skull MRI showing skull lesion with   moderate mass effect upon the right posterior parietal lobe, right occipital lobe, and right posterior temporal lobe secondary to the bony metastasis, sent in for admission for lung biopsy and neuro eval for skull mets. pt had biopsy of scalp lesion a few weeks ago and was found to have metastatic adenoca, had CTs, MRI PEt scan last week and found ot have primary lung ca, pt was sent in for admission for biopsy and neuro eval, currently has no complaints other than naxiety, due for her home xanax    Onc: Dr. Fall (sent in to see Dr. Barlow)

## 2022-10-25 NOTE — ED PROVIDER NOTE - PHYSICAL EXAMINATION
Gen: Well appearing in NAD  Head: NC/AT  Neck: trachea midline  cv: rrr  Resp:  No distress, lungs clear  abd nontender   Ext: no deformities  Neuro:  A&O appears non focal  Skin:  Warm and dry as visualized, large posterior scalp lesion   Psych:  Normal affect and mood

## 2022-10-25 NOTE — ED ADULT NURSE NOTE - OBJECTIVE STATEMENT
pt. received to 12B A&Ox4 ambulatory cares for self p/w oncology concerns. pt. newly Dx with metastatic lung ca with mets to the bones and skull, and was sent in by her oncologist to be admitted to the hospital for a lung biopsy and a neuro eval. pt. offers no complaints at this time. no increased work of breathing noted. no neurological deficits noted. respirations even and unlabored. 18g IV placed in the R AC. Labs sent. comfort measures provided. safety precautions maintained.

## 2022-10-25 NOTE — ED ADULT TRIAGE NOTE - CHIEF COMPLAINT QUOTE
pt oncology patient sent to ED for further studies (lung biopsy, neuro consult). recent diagnosed with metastatic lung ca not yet under going treatment.

## 2022-10-25 NOTE — ED PROVIDER NOTE - PROGRESS NOTE DETAILS
Mandile, PGY3: Labs nonactionable. Patient remains asymptomatic. Will admit for further workup. Mandile, PGY3: Labs nonactionable. Patient remains asymptomatic. Will admit for further workup. Hospitalist requesting neurosurgery consult. Spoke with neurosurgery, will see patient shortly.

## 2022-10-26 NOTE — H&P ADULT - NSHPPHYSICALEXAM_GEN_ALL_CORE
VITALS:   T(C): 36.6 (10-26-22 @ 02:15), Max: 36.9 (10-25-22 @ 20:50)  HR: 71 (10-26-22 @ 02:15) (71 - 81)  BP: 153/69 (10-26-22 @ 02:15) (153/69 - 155/75)  RR: 18 (10-26-22 @ 02:15) (16 - 18)  SpO2: 100% (10-26-22 @ 02:15) (98% - 100%)    GENERAL: NAD, lying in bed comfortably  HEAD:  Atraumatic, normocephalic  EYES: EOMI, PERRLA, conjunctiva and sclera clear  ENT: Moist mucous membranes  NECK: Supple, no JVD  HEART: Regular rate and rhythm, no murmurs, rubs, or gallops  LUNGS: Unlabored respirations.  Clear to auscultation bilaterally, no crackles, wheezing, or rhonchi  ABDOMEN: Soft, nontender, nondistended, +BS  EXTREMITIES: 2+ peripheral pulses bilaterally. No clubbing, cyanosis, or edema  NERVOUS SYSTEM:  A&Ox3, no focal deficits   SKIN: No rashes or lesions  Psych: Normal. normal behavior. normal speech VITALS:   T(C): 36.6 (10-26-22 @ 02:15), Max: 36.9 (10-25-22 @ 20:50)  HR: 71 (10-26-22 @ 02:15) (71 - 81)  BP: 153/69 (10-26-22 @ 02:15) (153/69 - 155/75)  RR: 18 (10-26-22 @ 02:15) (16 - 18)  SpO2: 100% (10-26-22 @ 02:15) (98% - 100%)    GENERAL: NAD, lying in bed comfortably  HEAD:  Atraumatic, normocephalic  EYES: EOMI, PERRLA, conjunctiva and sclera clear  ENT: Moist mucous membranes  NECK: Supple, no JVD  HEART: Regular rate and rhythm, no murmurs, rubs, or gallops  LUNGS: Unlabored respirations.  Clear to auscultation bilaterally, no crackles, wheezing, or rhonchi  ABDOMEN: Soft, nontender, nondistended, +BS  EXTREMITIES: 2+ peripheral pulses bilaterally. No clubbing, cyanosis, or edema  NERVOUS SYSTEM:  A&Ox3, no focal deficits   SKIN: 2x2cm indurated hard lesion on the right parietal scalp, with crusting and flaking. No discharge, no oozing  Psych: Normal. normal behavior. normal speech

## 2022-10-26 NOTE — CONSULT NOTE ADULT - SUBJECTIVE AND OBJECTIVE BOX
HPI:  83yo F recently found to have metastatic adenocarcinoma of lung ca to bone and skull MRI showing skull lesion with  moderate mass effect upon the right posterior parietal lobe, right occipital lobe, and right posterior temporal lobe secondary to the bony metastasis, sent in for admission for lung biopsy and neuro eval for skull mets.     pt had biopsy of scalp lesion a few weeks ago and was found to have metastatic adenocarcinoma.  Came to [facility] for neurological evaluation and lung biopsy but when consulted by IR they deemed her high risk for lung biopsy and deferred also already having  scalp lesion showing adenocarcinoma.    She is neurologically intact.  There is no plan for surgical intervention as though it was offered, patient / family refused any surgical interventions.  There is no plan for inpatient chemotherapy.     oncologist: Dr. Cantu, inpatient onc; Dr. Barlow.    We were asked about palliative RT for this 13cm x 2cm posterior scalp, extra dural lesion seen on MRI imaging 10/20/22.     < from: CT Stereotactic Localization No Cont (10.26.22 @ 08:56) >    IMPRESSION: Large area of osseous destruction involving the calvarium as   described above. Associated soft tissue component with intracranial and   extracalvarial extension is identified as well.    < from: NM PET/CT Onc FDG Skull to Thigh, Inital (10.13.22 @ 15:25) >  IMPRESSION: Abnormal FDG-PET/CT scan.    1. Large FDG-avid destructive lesion in right parietal calvarium with an  associated soft tissue mass which extends to the scalp and   intracranially. Subadjacent to the scalp component of the lesion there is   an indeterminate semilunar area of photopenia. The lesion extends across   the midline, into the left posteriorparietal calvarium. There appears to   be minimal mass effect on the brain. An MRI is recommended for further   evaluation.    2. FDG-avid spiculated right upper lobe pulmonary nodule and FDG-avid   mediastinal and right perihilar lymph nodes are suspicious for primary   lung carcinoma with lymph node metastases. Minimally FDG-avid right upper   lobe groundglass opacity is nonspecific. Contrast-enhanced CT of chest is   recommended for further evaluation.    3. Predominantly photopenic pelvic mass with mild, peripheral FDG avidity   is indeterminate. Contrast-enhanced CT or MRI is recommended for further   evaluation.    4. Nonspecific, asymmetric, increased FDG activity involving the left   external ear. Please correlate with direct visualization.    5. Right extrarenal pelvis containing a 0.8 cm nonobstructing calculus.    Finding discussed with Dr. Clemente Cantu and report emailed..    < end of copied text >        Allergies    No Known Allergies    Intolerances        ROS: [  ] Fever  [  ] Chills  [  ]Chest Pain [  ] SOB  [  ]Cough [  ] N/V  [  ] Diarrhea [  ]Constipation [  ]Other ROS:  [  ] ROS otherwise negative    PAST MEDICAL & SURGICAL HISTORY:  Heart murmur  as a child    Chronic UTI  since 10/13    History of arthroscopy of shoulder  left shoulder 2009        FAMILY HISTORY:  Family history of breast cancer (Sibling)        MEDICATIONS  (STANDING):  ALPRAZolam 0.25 milliGRAM(s) Oral Once  influenza  Vaccine (HIGH DOSE) 0.7 milliLiter(s) IntraMuscular once    MEDICATIONS  (PRN):  acetaminophen     Tablet .. 650 milliGRAM(s) Oral every 6 hours PRN Mild Pain (1 - 3)  LORazepam     Tablet 0.5 milliGRAM(s) Oral at bedtime PRN Anxiety  melatonin 3 milliGRAM(s) Oral at bedtime PRN Insomnia      PHYSICAL EXAM  Vital Signs Last 24 Hrs  T(C): 36.9 (26 Oct 2022 16:39), Max: 36.9 (25 Oct 2022 20:50)  T(F): 98.5 (26 Oct 2022 16:39), Max: 98.5 (26 Oct 2022 16:39)  HR: 77 (26 Oct 2022 16:39) (71 - 81)  BP: 155/80 (26 Oct 2022 17:12) (141/65 - 191/76)  BP(mean): --  RR: 18 (26 Oct 2022 16:39) (16 - 18)  SpO2: 100% (26 Oct 2022 16:39) (98% - 100%)    Parameters below as of 26 Oct 2022 16:39  Patient On (Oxygen Delivery Method): room air        General: Well nourished, well developed, no acute distress  HEENT: NC/AT; EOMI, PERRL, sclera nonicteric; external ears normal; no rhinorrhea or epistaxis; mucous membranes moist; oropharynx clear and without erythema  CV: NR, RR; no appreciable r/m/g  Lungs: CTAB, no increased work of breathing  Abdomen: Bowel sounds present; soft, NTND  MSK: Vertebral spine non-tender to palpation  Neuro: AAOx3; cranial nerves II-XII intact; strength 5/5 in upper and lower extremities; sensation to light touch in tact bilaterally.  Psych: Full affect; mood congruent  Skin: no visible rashes on limited examination    IMAGING/LABS/PATHOLOGY: I have personally reviewed the relevant labs, pathology, and imaging as noted in the HPI.  In addition,                          11.7   11.40 )-----------( 297      ( 25 Oct 2022 23:29 )             34.9     10-25    133<L>  |  97<L>  |  32<H>  ----------------------------<  98  4.3   |  24  |  1.63<H>    Ca    10.2      25 Oct 2022 23:56  Phos  3.4     10-26  Mg     2.00     10-26    TPro  8.2  /  Alb  4.1  /  TBili  <0.2  /  DBili  x   /  AST  22  /  ALT  12  /  AlkPhos  59  10-25        ASSESSMENT/PLAN    JUAN ALBERTO CERVANTES is a 82y woman with h/o metastatic lung adenocarcinoma presumably after outpatient biopsy of calvarium lesion showed adenoca, she was sent to [facility]  ED for IR guided biopsy of her lung lesion and for neurological work up of this large extra dural calvarial lesion at the posterior skull base that is 12cm in dimension.  No plans for chemotherapy while inpatient.  NSG did discuss craniectomy with flap and patient refused any/all surgery.  She is also neurologically intact.  Without any valid  reason to remain inpatient RT evaluation/treatment could be offered in the outpatient setting; but will d/w Dr. Norman.    HPI:      83yo F recently found to have metastatic adenocarcinoma of lung ca to bone and skull MRI showing skull lesion with  moderate mass effect upon the right posterior parietal lobe, right occipital lobe, and right posterior temporal lobe secondary to the bony metastasis, sent in for admission for lung biopsy and neuro eval for skull mets.   pt had biopsy of scalp lesion a few weeks ago and was found to have metastatic adenocarcinoma. Came to [facility] for neurological evaluation and lung biopsy but when consulted by IR they deemed her high risk for lung biopsy and deferred also already having scalp lesion showing adenocarcinoma.  She is neurologically intact.  There is no plan for surgical intervention as though it was offered, patient / family refused any surgical interventions. There is no plan for inpatient chemotherapy.   oncologist: Dr. Cantu, inpatient onc; Dr. Barlow.  We were asked about palliative RT for this 13cm x 2cm posterior scalp, extra dural lesion seen on MRI imaging 10/20/22.   < from: CT Stereotactic Localization No Cont (10.26.22 @ 08:56) >  IMPRESSION: Large area of osseous destruction involving the calvarium as  described above. Associated soft tissue component with intracranial and  extracalvarial extension is identified as well.  < from: NM PET/CT Onc FDG Skull to Thigh, Inital (10.13.22 @ 15:25) > IMPRESSION: Abnormal FDG-PET/CT scan.  1. Large FDG-avid destructive lesion in right parietal calvarium with an associated soft tissue mass which extends to the scalp and  intracranially. Subadjacent to the scalp component of the lesion there is  an indeterminate semilunar area of photopenia. The lesion extends across  the midline, into the left posteriorparietal calvarium. There appears to  be minimal mass effect on the brain. An MRI is recommended for further  evaluation.  2. FDG-avid spiculated right upper lobe pulmonary nodule and FDG-avid  mediastinal and right perihilar lymph nodes are suspicious for primary  lung carcinoma with lymph node metastases. Minimally FDG-avid right upper  lobe groundglass opacity is nonspecific. Contrast-enhanced CT of chest is  recommended for further evaluation.  3. Predominantly photopenic pelvic mass with mild, peripheral FDG avidity  is indeterminate. Contrast-enhanced CT or MRI is recommended for further  evaluation.  4. Nonspecific, asymmetric, increased FDG activity involving the left  external ear. Please correlate with direct visualization.  5. Right extrarenal pelvis containing a 0.8 cm nonobstructing calculus.  Finding discussed with Dr. Clemente Cantu and report emailed..  < end of copied text >    Allergies  No Known Allergies  Intolerances    ROS: [  ] Fever  [  ] Chills  [  ]Chest Pain [  ] SOB  [  ]Cough [  ] N/V  [  ] Diarrhea [  ]Constipation [  ]Other ROS: [  ] ROS otherwise negative  PAST MEDICAL & SURGICAL HISTORY: Heart murmur as a child  Chronic UTI since 10/13  History of arthroscopy of shoulder left shoulder 2009    FAMILY HISTORY: Family history of breast cancer (Sibling)    MEDICATIONS  (STANDING): ALPRAZolam 0.25 milliGRAM(s) Oral Once influenza  Vaccine (HIGH DOSE) 0.7 milliLiter(s) IntraMuscular once  MEDICATIONS  (PRN): acetaminophen     Tablet .. 650 milliGRAM(s) Oral every 6 hours PRN Mild Pain (1 - 3) LORazepam     Tablet 0.5 milliGRAM(s) Oral at bedtime PRN Anxiety melatonin 3 milliGRAM(s) Oral at bedtime PRN Insomnia   PHYSICAL EXAM Vital Signs Last 24 Hrs T(C): 36.9 (26 Oct 2022 16:39), Max: 36.9 (25 Oct 2022 20:50) T(F): 98.5 (26 Oct 2022 16:39), Max: 98.5 (26 Oct 2022 16:39) HR: 77 (26 Oct 2022 16:39) (71 - 81) BP: 155/80 (26 Oct 2022 17:12) (141/65 - 191/76) BP(mean): -- RR: 18 (26 Oct 2022 16:39) (16 - 18) SpO2: 100% (26 Oct 2022 16:39) (98% - 100%)  Parameters below as of 26 Oct 2022 16:39 Patient On (Oxygen Delivery Method): room air    General: Well nourished, well developed, no acute distress HEENT: NC/AT; as noted above, large destructive lesion of the right parietal skull.  Most of the lesion is limited to the bone.   Lungs: CTAB, no increased work of breathing Abdomen: Bowel sounds present; soft, NTND MSK: Vertebral spine non-tender to palpation Neuro: AAOx3; cranial nerves II-XII intact; strength 5/5 in upper and lower extremities; sensation to light touch in tact bilaterally. Psych: Full affect; mood congruent Skin: no visible rashes on limited examination  IMAGING/LABS/PATHOLOGY: I have personally reviewed the relevant labs, pathology, and imaging as noted in the HPI.  In addition,                       11.7  11.40 )-----------( 297      ( 25 Oct 2022 23:29 )            34.9   10-25  133<L>  |  97<L>  |  32<H> ----------------------------<  98 4.3   |  24  |  1.63<H>  Ca    10.2      25 Oct 2022 23:56 Phos  3.4     10-26 Mg     2.00     10-26  TPro  8.2  /  Alb  4.1  /  TBili  <0.2  /  DBili  x   /  AST  22  /  ALT  12  /  AlkPhos  59  10-25    ASSESSMENT/PLAN  JUAN ALBERTO CERVANTES is a 82y woman with h/o metastatic lung adenocarcinoma presumably after outpatient biopsy of calvarium lesion showed adenoca, she was sent to [facility] ED for IR guided biopsy of her lung lesion and for neurological work up of this large extra dural calvarial lesion at the posterior skull base that is 12cm in dimension. No plans for chemotherapy while inpatient.  NS did discuss craniectomy with flap and patient refused any/all surgery.  She is also neurologically intact.  Without any valid reason to remain inpatient RT evaluation/treatment could be offered in the outpatient setting; but will d/w Dr. Norman.

## 2022-10-26 NOTE — H&P ADULT - NSHPSOCIALHISTORY_GEN_ALL_CORE
Patient is a  and she lives alone. She never had any natural children, but she has two well adult adopted children, She is a smoker who smokes 1 PPD x 55 years ,and she drinks alcohol socially. Patient is a retired  for [employer].

## 2022-10-26 NOTE — H&P ADULT - PROBLEM SELECTOR PLAN 5
- Sodium 133 on admission. No history of Hyponatremia per chart review.  - Possibly secondary to poor PO intake vs SIADH in setting of lung malignancy  - Trend BMP  - Encourage PO intake

## 2022-10-26 NOTE — PATIENT PROFILE ADULT - FALL HARM RISK - HARM RISK INTERVENTIONS

## 2022-10-26 NOTE — H&P ADULT - ATTENDING COMMENTS
Agree w/ above 82 year old hx of anxiety, HLD, renal stones here w/ adenocarcinoma of scalp unknown primary source, has mets to lung and brain. Here for lung biopsy and neurosurgery eval for brain lesions.  #Adenocarcinoma metastatic unknown primary  -IR for lung biopsy  -email to heme/onc  -obtain coags and type and screen  -NPO for now  #Brain metastatic lesions  -no focal neurologic deficits, no seizures or falls  -neurosurgery is following  #Anxiety  -continue to monitor  #History of renal stones  -continue to monitor  #JONAH  -u/a urine lytes  -trial 500 cc NS

## 2022-10-26 NOTE — CONSULT NOTE ADULT - SUBJECTIVE AND OBJECTIVE BOX
NEUROSURGERY CONSULT    HPI: 82y Female recently found to have metastatic lung ca admitted to medicine for lung Bx. patient had a right posterior scalp mass recently biopsied by General surgeon on 8/30pathology adeno ca. she had an mri brain/pet scan and CT chest/ abdomen on 10/20 showing a RUL lung mass and large r. parietal calvarial lesion crossing midline to left, no invasion of brain parenchyma.    RADIOLOGY: IMPRESSION:    1.  Spiculated 1.8 cm nodule in the right upper lobe withmediastinal and   right hilar lymphadenopathy suggesting primary lung malignancy with   metastatic disease. Indeterminant subcentimeter solid nodules in the left   upper lobe.  2.  Hydrometra. Concern for cervical stenosis or mass. Recommend pelvic   ultrasound for further evaluation.      IMPRESSION: Abnormal FDG-PET/CT scan.    1. Large FDG-avid destructive lesion in right parietal calvarium with an  associated soft tissue mass which extends to the scalp and   intracranially. Subadjacent to the scalp component of the lesion there is   an indeterminate semilunar area of photopenia. The lesion extends across   the midline, into the left posteriorparietal calvarium. There appears to   be minimal mass effect on the brain. An MRI is recommended for further   evaluation.    2. FDG-avid spiculated right upper lobe pulmonary nodule and FDG-avid   mediastinal and right perihilar lymph nodes are suspicious for primary   lung carcinoma with lymph node metastases. Minimally FDG-avid right upper   lobe groundglass opacity is nonspecific. Contrast-enhanced CT of chest is   recommended for further evaluation.    3. Predominantly photopenic pelvic mass with mild, peripheral FDG avidity   is indeterminate. Contrast-enhanced CT or MRI is recommended for further   evaluation.    4. Nonspecific, asymmetric, increased FDG activity involving the left   external ear. Please correlate with direct visualization.    5. Right extrarenal pelvis containing a 0.8 cm nonobstructing calculus.    Finding discussed with Dr. Clemente Cantu and report emailed..    mri brain w/wo 10/20 impression:      IMPRESSION:    Marked expansion and abnormal signal within the entire right posterior parietal and occipital calvarium crossing the midline to include the left occipital calvarium, measuring at least 13 cm in length by 2 cm in width (largest dimension), compatible with known large osseous metastasis. There is abnormal signal along the entire dura underlying the lesion, most suggestive of dural infiltration. There is no invasion of the brain parenchyma. There is moderate mass effect upon the right posterior parietal lobe, right occipital lobe, and right posterior temporal lobe secondary to the bony metastasis. There is a dominant overlying soft tissue component measuring approximately 3.6 x 1.4 cm. There are several additional scattered subcentimeter foci of enhancement within the rest of the calvarium, suggestive of additional very small osseous metastases.    MEDS:      PHYSICAL EXAM: awake, A&Ox3, fc  perrl, tracts  GARNER 5/5  R. posterior scalp lesion w/ scab and old sutures  no wound leakage noted    Vital Signs Last 24 Hrs  T(C): 36.6 (26 Oct 2022 02:15), Max: 36.9 (25 Oct 2022 20:50)  T(F): 97.8 (26 Oct 2022 02:15), Max: 98.4 (25 Oct 2022 20:50)  HR: 71 (26 Oct 2022 02:15) (71 - 81)  BP: 153/69 (26 Oct 2022 02:15) (153/69 - 155/75)  BP(mean): --  RR: 18 (26 Oct 2022 02:15) (16 - 18)  SpO2: 100% (26 Oct 2022 02:15) (98% - 100%)    Parameters below as of 26 Oct 2022 02:15  Patient On (Oxygen Delivery Method): room air        LABS:                        11.7   11.40 )-----------( 297      ( 25 Oct 2022 23:29 )             34.9     10-25    133<L>  |  97<L>  |  32<H>  ----------------------------<  98  4.3   |  24  |  1.63<H>    Ca    10.2      25 Oct 2022 23:56    TPro  8.2  /  Alb  4.1  /  TBili  <0.2  /  DBili  x   /  AST  22  /  ALT  12  /  AlkPhos  59  10-25    PT/INR - ( 25 Oct 2022 23:29 )   PT: 11.2 sec;   INR: 0.97 ratio         PTT - ( 25 Oct 2022 23:29 )  PTT:29.9 sec

## 2022-10-26 NOTE — CONSULT NOTE ADULT - ATTENDING COMMENTS
Large osseous metastatic lesion with compression of the underlying brain but no jn brain invasion. Will discuss management options in a multidisciplinary fashion and with patient and family. No emergent intervention warranted.

## 2022-10-26 NOTE — PROGRESS NOTE ADULT - PROBLEM SELECTOR PLAN 1
- CT Chest 10/19 with Spiculated 1.8 cm nodule in the right upper lobe with mediastinal and right hilar lymphadenopathy suggesting primary lung malignancy with metastatic disease. Indeterminant subcentimeter solid nodules in the left upper lobe.  - PET Scan from 10/13 significant for findings consistent with pulmonary lung carcinoma as indicated above  - Malignancy markers CEA, , CA-125 all elevated 09/2022  - Plan for Lung biopsy while admitted  - Oncology consult

## 2022-10-26 NOTE — PROGRESS NOTE ADULT - PROBLEM SELECTOR PLAN 3
- Per chart review baseline Cr over the last from 03/2022 ~1.30  - Possible contrast induced in setting of multiple administrations of contrast over the last couple of weeks vs pre-renal   - 10/19 CTAP Right extrarenal pelvis. Bilateral renal calculi, the largest measuring 8 mm in craniocaudal dimension in the interpolar right kidney. No hydronephrosis.  intracranially.  - Urine lytes   - Can consider renal US if persistent elevation in Cr s/p fluid bolus 500cc  - Avoid nephrotoxic meds and trend Cr

## 2022-10-26 NOTE — H&P ADULT - PROBLEM SELECTOR PLAN 3
- Per chart review baseline Cr over the last from 03/2022 ~1.30  - Possible contrast induced in setting of multiple administrations of contrast over the last couple of weeks vs pre-renal   - 10/19 CTAP Right extrarenal pelvis. Bilateral renal calculi, the largest measuring 8 mm in craniocaudal dimension in the interpolar right kidney. No hydronephrosis.  intracranially.  - Urine lytes   - Consider renal US if persistent elevation in Cr.  - Avoid nephrotoxic meds and trend Cr - Per chart review baseline Cr over the last from 03/2022 ~1.30  - Possible contrast induced in setting of multiple administrations of contrast over the last couple of weeks vs pre-renal   - 10/19 CTAP Right extrarenal pelvis. Bilateral renal calculi, the largest measuring 8 mm in craniocaudal dimension in the interpolar right kidney. No hydronephrosis.  intracranially.  - Urine lytes   - Can consider renal US if persistent elevation in Cr s/p fluid bolus 500cc  - Avoid nephrotoxic meds and trend Cr

## 2022-10-26 NOTE — H&P ADULT - PROBLEM SELECTOR PLAN 4
- Predominantly photopenic pelvic mass with mild, peripheral FDG avidity is indeterminate. Contrast-enhanced CT or MRI is recommended for further evaluation.  - F/U Pelvic MRI

## 2022-10-26 NOTE — CONSULT NOTE ADULT - ASSESSMENT
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 Her MRI of brain shows mass lesion on brain from 13 cm mass in calvarium with no brain lesions and no edema. Pt has been having headaches for several months. Her PETCT  shows mass in lung and multiple adenopathy consistent with lung primary. Her initial biopsy is from outside is small specimen and pt will need more tissue.   Admitted for pulmonary consult for possible bronch and neuro-oncology evaluation as pt has dural infiltration.     Solitary lesion to the right parietal scalp biopsied and found to be metastatic scalp lesion from primary lung carcinoma  - PET Scan 10/13 Large FDG-avid destructive lesion in right parietal calvarium with an associated soft tissue mass which extends to the scalp and intracranially.  - Underlying mets to the calvarium  - MRI 10/20 showing Marked expansion and abnormal signal within the entire right posterior parietal and occipital calvarium crossing the midline to include the left occipital calvarium, measuring at least 13 cm in length by 2 cm in width (largest dimension), compatible with known large osseous metastasis. Abnormal signal along the entire dura underlying the lesion, most suggestive of dural infiltration. No invasion of the brain parenchyma. Moderate mass effect upon the right posterior parietal lobe, right occipital lobe, and right posterior temporal lobe secondary to the bony metastasis. There is a dominant overlying soft tissue component measuring approximately 3.6 x 1.4 cm. Several additional scattered subcentimeter foci of enhancement within the rest of the calvarium, suggestive of additional very small osseous metastases.      -please consult interventional pulm for biopsy of the lung mass  -Will need next generation sequencing and PDL1 testing on the biopsy specimen to guide systemic treatment options  -Neuro surg input appreciated  -Neuro-oncology evaluation  -Radiation oncology evaluation  -Monitor Cr  -No plans for inpatient chemotherapy  -Supportive care, pain control, Nutrition, PT, DVT ppx  -Outpatient oncology f/u    Will follow. Please do not hesitate to call back with questions.     Laure Barlow MD  Medical Oncology Attending  C: [phone]     time spent on direct patient care, interdisciplinary discussions and chart review.

## 2022-10-26 NOTE — CONSULT NOTE ADULT - ASSESSMENT
82y F with RUL lung mass, right posterior parietal occipital calavrial lesion crossing midline to left with mosrerate mass effect upon right parietal and occipital lobe, no invasion of brain parenchyma     82y F with RUL lung mass, right posterior parietal occipital calavrial lesion crossing midline to left with moderate mass effect upon right parietal and occipital lobe, no invasion of brain parenchyma.    recc:  - obtain stereotactic cth   - oncology f/u    will d/w attending

## 2022-10-26 NOTE — PROGRESS NOTE ADULT - SUBJECTIVE AND OBJECTIVE BOX
**************************************  Jasonguy Jam, PGY-1  After 7PM, please contact night float at #[phone] or [phone]  **************************************    INTERVAL HPI/OVERNIGHT EVENTS:      VITAL SIGNS:  T(F): 97.9 (10-26-22 @ 07:17)  HR: 76 (10-26-22 @ 07:17)  BP: 141/65 (10-26-22 @ 07:17)  RR: 18 (10-26-22 @ 07:17)  SpO2: 100% (10-26-22 @ 07:17)  Wt(kg): --    PHYSICAL EXAM:    GENERAL: NAD, lying in bed comfortably  HEAD:  Atraumatic, normocephalic  EYES: EOMI, PERRLA, conjunctiva and sclera clear  ENT: Moist mucous membranes  NECK: Supple, no JVD  HEART: Regular rate and rhythm, no murmurs, rubs, or gallops  LUNGS: Unlabored respirations.  Clear to auscultation bilaterally, no crackles, wheezing, or rhonchi  ABDOMEN: Soft, nontender, nondistended, +BS  EXTREMITIES: 2+ peripheral pulses bilaterally. No clubbing, cyanosis, or edema  NERVOUS SYSTEM:  A&Ox3, no focal deficits   SKIN: 2x2cm indurated hard lesion on the right parietal scalp, with crusting and flaking. No discharge, no oozing  MEDICATIONS  (STANDING):    MEDICATIONS  (PRN):  LORazepam     Tablet 0.5 milliGRAM(s) Oral at bedtime PRN Anxiety  melatonin 3 milliGRAM(s) Oral at bedtime PRN Insomnia      Allergies    No Known Allergies    Intolerances        LABS:                        11.7   11.40 )-----------( 297      ( 25 Oct 2022 23:29 )             34.9     10-25    133<L>  |  97<L>  |  32<H>  ----------------------------<  98  4.3   |  24  |  1.63<H>    Ca    10.2      25 Oct 2022 23:56    TPro  8.2  /  Alb  4.1  /  TBili  <0.2  /  DBili  x   /  AST  22  /  ALT  12  /  AlkPhos  59  10-25    PT/INR - ( 25 Oct 2022 23:29 )   PT: 11.2 sec;   INR: 0.97 ratio         PTT - ( 25 Oct 2022 23:29 )  PTT:29.9 sec      RADIOLOGY & ADDITIONAL TESTS:  Reviewed

## 2022-10-26 NOTE — CONSULT NOTE ADULT - SUBJECTIVE AND OBJECTIVE BOX
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 pt had biopsy of scalp lesion a few weeks ago and was found to have metastatic adenoca, had CTs, MRI PEt scan last week and found ot have primary lung ca, pt was sent in for admission for biopsy and neuro eval, currently has no complaints other than anxiety, due for her home xanax   (26 Oct 2022 04:49)     ROS: as above     PAST MEDICAL & SURGICAL HISTORY:  Heart murmur  as a child      Chronic UTI  since 10/13      History of arthroscopy of shoulder  left shoulder 2009          SOCIAL HISTORY:    FAMILY HISTORY:  Family history of breast cancer (Sibling)        MEDICATIONS  (STANDING):    MEDICATIONS  (PRN):  LORazepam     Tablet 0.5 milliGRAM(s) Oral at bedtime PRN Anxiety  melatonin 3 milliGRAM(s) Oral at bedtime PRN Insomnia      Allergies    No Known Allergies    Intolerances        Vital Signs Last 24 Hrs  T(C): 36.7 (26 Oct 2022 12:33), Max: 36.9 (25 Oct 2022 20:50)  T(F): 98 (26 Oct 2022 12:33), Max: 98.4 (25 Oct 2022 20:50)  HR: 73 (26 Oct 2022 12:33) (71 - 81)  BP: 154/60 (26 Oct 2022 12:33) (141/65 - 155/75)  BP(mean): --  RR: 18 (26 Oct 2022 12:33) (16 - 18)  SpO2: 99% (26 Oct 2022 12:33) (98% - 100%)    Parameters below as of 26 Oct 2022 12:33  Patient On (Oxygen Delivery Method): room air        PHYSICAL EXAM  General: adult in NAD  HEENT: clear oropharynx, anicteric sclera, pink conjunctiva  Neck: supple  CV: normal S1/S2 with no murmur rubs or gallops  Lungs: positive air movement b/l ant lungs, clear to auscultation, no wheezes, no rales  Abdomen: soft non-tender non-distended, no hepatosplenomegaly  Ext: no clubbing cyanosis or edema  Skin: no rashes and no petechiae  Neuro: alert and oriented X 3, none focal    LABS:                          11.7   11.40 )-----------( 297      ( 25 Oct 2022 23:29 )             34.9         Mean Cell Volume : 81.9 fL  Mean Cell Hemoglobin : 27.5 pg  Mean Cell Hemoglobin Concentration : 33.5 gm/dL  Auto Neutrophil # : 7.95 K/uL  Auto Lymphocyte # : 2.15 K/uL  Auto Monocyte # : 1.05 K/uL  Auto Eosinophil # : 0.10 K/uL  Auto Basophil # : 0.06 K/uL  Auto Neutrophil % : 69.7 %  Auto Lymphocyte % : 18.9 %  Auto Monocyte % : 9.2 %  Auto Eosinophil % : 0.9 %  Auto Basophil % : 0.5 %      Serial CBC's  10-25 @ 23:29  Hct-34.9 / Hgb-11.7 / Plat-297 / RBC-4.26 / WBC-11.40      10-25    133<L>  |  97<L>  |  32<H>  ----------------------------<  98  4.3   |  24  |  1.63<H>    Ca    10.2      25 Oct 2022 23:56  Phos  3.4     10-26  Mg     2.00     10-26    TPro  8.2  /  Alb  4.1  /  TBili  <0.2  /  DBili  x   /  AST  22  /  ALT  12  /  AlkPhos  59  10-25      PT/INR - ( 26 Oct 2022 07:14 )   PT: 12.0 sec;   INR: 1.03 ratio         PTT - ( 26 Oct 2022 07:14 )  PTT:28.9 sec        RADIOLOGY & ADDITIONAL STUDIES  reviewed Patient is a 82y old  Female who presents with a chief complaint of Metastatic lung cancer (26 Oct 2022 08:27)      HPI:  83yo F 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 pt had biopsy of scalp lesion a few weeks ago and was found to have metastatic adenoca, had CTs, MRI PEt scan last week and found ot have primary lung ca, pt was sent in for admission for biopsy and neuro eval, currently has no complaints other than anxiety, due for her home xanax   (26 Oct 2022 04:49)     ROS: as above     PAST MEDICAL & SURGICAL HISTORY:  Heart murmur  as a child      Chronic UTI  since 10/13      History of arthroscopy of shoulder  left shoulder 2009          SOCIAL HISTORY: NC    FAMILY HISTORY:  Family history of breast cancer (Sibling)        MEDICATIONS  (STANDING):    MEDICATIONS  (PRN):  LORazepam     Tablet 0.5 milliGRAM(s) Oral at bedtime PRN Anxiety  melatonin 3 milliGRAM(s) Oral at bedtime PRN Insomnia      Allergies    No Known Allergies    Intolerances        Vital Signs Last 24 Hrs  T(C): 36.7 (26 Oct 2022 12:33), Max: 36.9 (25 Oct 2022 20:50)  T(F): 98 (26 Oct 2022 12:33), Max: 98.4 (25 Oct 2022 20:50)  HR: 73 (26 Oct 2022 12:33) (71 - 81)  BP: 154/60 (26 Oct 2022 12:33) (141/65 - 155/75)  BP(mean): --  RR: 18 (26 Oct 2022 12:33) (16 - 18)  SpO2: 99% (26 Oct 2022 12:33) (98% - 100%)    Parameters below as of 26 Oct 2022 12:33  Patient On (Oxygen Delivery Method): room air        PHYSICAL EXAM  General: adult in NAD  HEENT: clear oropharynx, anicteric sclera, pink conjunctiva  Neck: supple  CV: normal S1/S2 with no murmur rubs or gallops  Lungs: positive air movement b/l ant lungs, clear to auscultation, no wheezes, no rales  Abdomen: soft non-tender non-distended, no hepatosplenomegaly  Ext: no clubbing cyanosis or edema  Skin: no rashes and no petechiae  Neuro: alert and oriented X 3, none focal    LABS:                          11.7   11.40 )-----------( 297      ( 25 Oct 2022 23:29 )             34.9         Mean Cell Volume : 81.9 fL  Mean Cell Hemoglobin : 27.5 pg  Mean Cell Hemoglobin Concentration : 33.5 gm/dL  Auto Neutrophil # : 7.95 K/uL  Auto Lymphocyte # : 2.15 K/uL  Auto Monocyte # : 1.05 K/uL  Auto Eosinophil # : 0.10 K/uL  Auto Basophil # : 0.06 K/uL  Auto Neutrophil % : 69.7 %  Auto Lymphocyte % : 18.9 %  Auto Monocyte % : 9.2 %  Auto Eosinophil % : 0.9 %  Auto Basophil % : 0.5 %      Serial CBC's  10-25 @ 23:29  Hct-34.9 / Hgb-11.7 / Plat-297 / RBC-4.26 / WBC-11.40      10-25    133<L>  |  97<L>  |  32<H>  ----------------------------<  98  4.3   |  24  |  1.63<H>    Ca    10.2      25 Oct 2022 23:56  Phos  3.4     10-26  Mg     2.00     10-26    TPro  8.2  /  Alb  4.1  /  TBili  <0.2  /  DBili  x   /  AST  22  /  ALT  12  /  AlkPhos  59  10-25      PT/INR - ( 26 Oct 2022 07:14 )   PT: 12.0 sec;   INR: 1.03 ratio         PTT - ( 26 Oct 2022 07:14 )  PTT:28.9 sec        RADIOLOGY & ADDITIONAL STUDIES  reviewed

## 2022-10-26 NOTE — H&P ADULT - ASSESSMENT
82y F with RUL lung mass, right posterior parietal occipital calvarial lesion crossing midline to left with moderate mass effect upon right parietal and occipital lobe, no invasion of brain parenchyma.

## 2022-10-26 NOTE — H&P ADULT - PROBLEM SELECTOR PLAN 6
- Leucocytosis possibly reactive in setting of metastasis  - Monitor off abx  - Trend WBC and fever curve

## 2022-10-26 NOTE — H&P ADULT - PROBLEM SELECTOR PLAN 2
- Solitary lesion to the right parietal scalp biopsied and found to be metastatic scalp lesion from primary lung carcinoma  - PET Scan 10/13 Large FDG-avid destructive lesion in right parietal calvarium with an associated soft tissue mass which extends to the scalp and intracranially.  - Underlying mets to the calvarium  - MRI 10/20 showing Marked expansion and abnormal signal within the entire right posterior parietal and occipital calvarium crossing the midline to include the left occipital calvarium, measuring at least 13 cm in length by 2 cm in width (largest dimension), compatible with known large osseous metastasis. Abnormal signal along the entire dura underlying the lesion, most suggestive of dural infiltration. No invasion of the brain parenchyma. Moderate mass effect upon the right posterior parietal lobe, right occipital lobe, and right posterior temporal lobe secondary to the bony metastasis. There is a dominant overlying soft tissue component measuring approximately 3.6 x 1.4 cm. Several additional scattered subcentimeter foci of enhancement within the rest of the calvarium, suggestive of additional very small osseous metastases.  - Neurosurgery on board. Appreciate recs  - F/U Stereotactic head CT

## 2022-10-26 NOTE — H&P ADULT - HISTORY OF PRESENT ILLNESS
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 pt had biopsy of scalp lesion a few weeks ago and was found to have metastatic adenoca, had CTs, MRI PEt scan last week and found ot have primary lung ca, pt was sent in for admission for biopsy and neuro eval, currently has no complaints other than naxiety, due for her home xanax

## 2022-10-26 NOTE — H&P ADULT - PROBLEM SELECTOR PLAN 1
- CT Chest 10/19 with Spiculated 1.8 cm nodule in the right upper lobe withmediastinal and right hilar lymphadenopathy suggesting primary lung malignancy with metastatic disease. Indeterminant subcentimeter solid nodules in the left upper lobe.  - PET Scan from 10/13 significant for findings consistent with pulmonary lung carcinoma as indicated above  - Malignancy markers CEA, , CA-125 all elevated 09/2022  - Plan for Lung biopsy while admitted - CT Chest 10/19 with Spiculated 1.8 cm nodule in the right upper lobe withmediastinal and right hilar lymphadenopathy suggesting primary lung malignancy with metastatic disease. Indeterminant subcentimeter solid nodules in the left upper lobe.  - PET Scan from 10/13 significant for findings consistent with pulmonary lung carcinoma as indicated above  - Malignancy markers CEA, , CA-125 all elevated 09/2022  - Plan for Lung biopsy while admitted  - Oncology consult

## 2022-10-26 NOTE — H&P ADULT - NSHPREVIEWOFSYSTEMS_GEN_ALL_CORE
REVIEW OF SYSTEMS:    CONSTITUTIONAL: No weakness, fevers or chills  EYES: PEERLA  ENT: No visual changes;  No vertigo or throat pain   NECK: No pain or stiffness  RESPIRATORY: No cough, wheezing, hemoptysis; No shortness of breath  CARDIOVASCULAR: No chest pain or palpitations  GASTROINTESTINAL: No abdominal or epigastric pain. No nausea, vomiting, or hematemesis; No diarrhea or constipation. No melena or hematochezia.  GENITOURINARY: No dysuria, frequency or hematuria  NEUROLOGICAL: No numbness or weakness  SKIN: No itching, rashes  Psych: No anxiety. No depression

## 2022-10-26 NOTE — ED ADULT NURSE REASSESSMENT NOTE - NS ED NURSE REASSESS COMMENT FT1
AAOx4, c/o anxiety, team at bedside will prescribe xanax, no signs of distress, pending bed placement, fall precautions maintained
AAOx4, c/o anxiety, team paged, no signs of distress, pending bed placement/IR consult, fall precautions maintained
Received report on pt. Resting comfortably in stretcher, vitals as charted. Respirations are even and unlabored. Pt endorsing no complaints at this time. Pt awaiting bed assignment.

## 2022-10-26 NOTE — H&P ADULT - NSHPLABSRESULTS_GEN_ALL_CORE
.  LABS:                         11.7   11.40 )-----------( 297      ( 25 Oct 2022 23:29 )             34.9     10-25    133<L>  |  97<L>  |  32<H>  ----------------------------<  98  4.3   |  24  |  1.63<H>    Ca    10.2      25 Oct 2022 23:56    TPro  8.2  /  Alb  4.1  /  TBili  <0.2  /  DBili  x   /  AST  22  /  ALT  12  /  AlkPhos  59  10-25    PT/INR - ( 25 Oct 2022 23:29 )   PT: 11.2 sec;   INR: 0.97 ratio         PTT - ( 25 Oct 2022 23:29 )  PTT:29.9 sec          RADIOLOGY, EKG & ADDITIONAL TESTS: Reviewed.       RADIOLOGY: IMPRESSION:    1.  Spiculated 1.8 cm nodule in the right upper lobe withmediastinal and   right hilar lymphadenopathy suggesting primary lung malignancy with   metastatic disease. Indeterminant subcentimeter solid nodules in the left   upper lobe.  2.  Hydrometra. Concern for cervical stenosis or mass. Recommend pelvic   ultrasound for further evaluation.      IMPRESSION: Abnormal FDG-PET/CT scan.    1. Large FDG-avid destructive lesion in right parietal calvarium with an  associated soft tissue mass which extends to the scalp and   intracranially. Subadjacent to the scalp component of the lesion there is   an indeterminate semilunar area of photopenia. The lesion extends across   the midline, into the left posteriorparietal calvarium. There appears to   be minimal mass effect on the brain. An MRI is recommended for further   evaluation.    2. FDG-avid spiculated right upper lobe pulmonary nodule and FDG-avid   mediastinal and right perihilar lymph nodes are suspicious for primary   lung carcinoma with lymph node metastases. Minimally FDG-avid right upper   lobe groundglass opacity is nonspecific. Contrast-enhanced CT of chest is   recommended for further evaluation.    3. Predominantly photopenic pelvic mass with mild, peripheral FDG avidity   is indeterminate. Contrast-enhanced CT or MRI is recommended for further   evaluation.    4. Nonspecific, asymmetric, increased FDG activity involving the left   external ear. Please correlate with direct visualization.    5. Right extrarenal pelvis containing a 0.8 cm nonobstructing calculus.    Finding discussed with Dr. Clemente Cantu and report emailed..    mri brain w/wo 10/20 impression:      IMPRESSION:    Marked expansion and abnormal signal within the entire right posterior parietal and occipital calvarium crossing the midline to include the left occipital calvarium, measuring at least 13 cm in length by 2 cm in width (largest dimension), compatible with known large osseous metastasis. There is abnormal signal along the entire dura underlying the lesion, most suggestive of dural infiltration. There is no invasion of the brain parenchyma. There is moderate mass effect upon the right posterior parietal lobe, right occipital lobe, and right posterior temporal lobe secondary to the bony metastasis. There is a dominant overlying soft tissue component measuring approximately 3.6 x 1.4 cm. There are several additional scattered subcentimeter foci of enhancement within the rest of the calvarium, suggestive of additional very small osseous metastases.

## 2022-10-27 NOTE — CONSULT NOTE ADULT - PROBLEM SELECTOR RECOMMENDATION 9
CT chest showed Spiculated 1.8 cm nodule in the right upper lobe with mediastinal and right hilar lymphadenopathy suggesting primary lung malignancy with metastatic disease.  - Pt followed by pulm, planned bronchoscopy with tissue biopsy tomorrow 10/28 - Imaging shows spiculated 1.8 cm nodule in the right upper lobe with mediastinal and right hilar lymphadenopathy suggesting primary lung malignancy, skull lesions suggestive of metastatic disease    - Pt followed by pulm, planned bronchoscopy with tissue biopsy tomorrow 10/28  - Patient refusing RT for skull, became anxious during simulation, refuses to pursue   - Outpatient follow up

## 2022-10-27 NOTE — PROGRESS NOTE ADULT - SUBJECTIVE AND OBJECTIVE BOX
**************************************  Sarah Polk, PGY-1  After 7PM, please contact night float at #[phone] or [phone]  **************************************    INTERVAL HPI/OVERNIGHT EVENTS:  Patient was seen and examined at bedside. As per nurse and patient, no o/n events, patient resting comfortably. No complaints at this time. Patient denies: fever, chills, dizziness, weakness, HA, Changes in vision, CP, palpitations, SOB, cough, N/V/D/C, dysuria, changes in bowel movements, LE edema. ROS otherwise negative.    VITAL SIGNS:  T(F): 97.9 (10-26-22 @ 21:26)  HR: 67 (10-26-22 @ 21:26)  BP: 136/63 (10-26-22 @ 21:26)  RR: 18 (10-26-22 @ 21:26)  SpO2: 99% (10-26-22 @ 21:26)  Wt(kg): --      PHYSICAL EXAM:    GENERAL: NAD, lying in bed comfortably  HEAD:  Atraumatic, normocephalic  EYES: EOMI, PERRLA, conjunctiva and sclera clear  ENT: Moist mucous membranes  NECK: Supple, no JVD  HEART: Regular rate and rhythm, no murmurs, rubs, or gallops  LUNGS: Unlabored respirations.  Clear to auscultation bilaterally, no crackles, wheezing, or rhonchi  ABDOMEN: Soft, nontender, nondistended, +BS  EXTREMITIES: 2+ peripheral pulses bilaterally. No clubbing, cyanosis, or edema  NERVOUS SYSTEM:  A&Ox3, no focal deficits   SKIN: 2x2cm indurated hard lesion on the right parietal scalp, with crusting and flaking. No discharge, no oozing      MEDICATIONS  (STANDING):  ALPRAZolam 0.25 milliGRAM(s) Oral Once  influenza  Vaccine (HIGH DOSE) 0.7 milliLiter(s) IntraMuscular once    MEDICATIONS  (PRN):  acetaminophen     Tablet .. 650 milliGRAM(s) Oral every 6 hours PRN Mild Pain (1 - 3)  LORazepam     Tablet 0.5 milliGRAM(s) Oral at bedtime PRN Anxiety  melatonin 3 milliGRAM(s) Oral at bedtime PRN Insomnia      Allergies    No Known Allergies    Intolerances        LABS:                        11.7   11.40 )-----------( 297      ( 25 Oct 2022 23:29 )             34.9     10-25    133<L>  |  97<L>  |  32<H>  ----------------------------<  98  4.3   |  24  |  1.63<H>    Ca    10.2      25 Oct 2022 23:56  Phos  3.4     10-26  Mg     2.00     10-26    TPro  8.2  /  Alb  4.1  /  TBili  <0.2  /  DBili  x   /  AST  22  /  ALT  12  /  AlkPhos  59  10-25    PT/INR - ( 26 Oct 2022 07:14 )   PT: 12.0 sec;   INR: 1.03 ratio         PTT - ( 26 Oct 2022 07:14 )  PTT:28.9 sec      RADIOLOGY & ADDITIONAL TESTS:  Reviewed

## 2022-10-27 NOTE — PROGRESS NOTE ADULT - ASSESSMENT
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 Her MRI of brain shows mass lesion on brain from 13 cm mass in calvarium with no brain lesions and no edema. Pt has been having headaches for several months. Her PETCT  shows mass in lung and multiple adenopathy consistent with lung primary. Her initial biopsy is from outside is small specimen and pt will need more tissue.   Admitted for pulmonary consult for possible bronch and neuro-oncology evaluation as pt has dural infiltration.     Solitary lesion to the right parietal scalp biopsied and found to be metastatic scalp lesion from primary lung carcinoma  - PET Scan 10/13 Large FDG-avid destructive lesion in right parietal calvarium with an associated soft tissue mass which extends to the scalp and intracranially.  - Underlying mets to the calvarium  - MRI 10/20 showing Marked expansion and abnormal signal within the entire right posterior parietal and occipital calvarium crossing the midline to include the left occipital calvarium, measuring at least 13 cm in length by 2 cm in width (largest dimension), compatible with known large osseous metastasis. Abnormal signal along the entire dura underlying the lesion, most suggestive of dural infiltration. No invasion of the brain parenchyma. Moderate mass effect upon the right posterior parietal lobe, right occipital lobe, and right posterior temporal lobe secondary to the bony metastasis. There is a dominant overlying soft tissue component measuring approximately 3.6 x 1.4 cm. Several additional scattered subcentimeter foci of enhancement within the rest of the calvarium, suggestive of additional very small osseous metastases.      -Interventional pulm consulted for biopsy of the lung mass, will followup recs  -Will need next generation sequencing and PDL1 testing on the biopsy specimen to guide systemic treatment options  -Patient would not need to stay admitted for biopsy results  -Neuro surg input appreciated, pt and family deferring any surgical intervention  -Neuro-oncology evaluation pending  -Radiation oncology evaluation, plan for simulation today  -agree with Pall Care consult for further pain management and GOC  -Monitor Cr  -No plans for inpatient chemotherapy  -Rest of care as per primary team  -Patient to followup with Dr. Cantu ([facility]) upon discharge  -C/w Supportive care, pain control, Nutrition, PT, DVT ppx  -Oncology will continue to follow with you      Case discussed with Dr. Yen MUÑIZ  Oncology Physician Assistant  Judy ISLAS/LEBRON [facility]  Pager [phone] also available on Teams    If before 8am/after 5pm or on weekends please page On-call Oncology Fellow

## 2022-10-27 NOTE — CONSULT NOTE ADULT - REASON FOR ADMISSION
Metastatic lung cancer

## 2022-10-27 NOTE — CONSULT NOTE ADULT - TIME BILLING
83 y/o with a "lump" on the occipital/parietal scalp.  It is pushing the brain, but not frankly invading.  RT can be offered which can be helpful to dry up the exophytic portion.  Her performance status is overall good ECOG 1.
Review of medical records, lab results and coordination of care.

## 2022-10-27 NOTE — CONSULT NOTE ADULT - CONVERSATION DETAILS
Referral to palliative care for complex decision making in the setting of advanced malignancy. Extensive discussion at the bedside with patient and daughter Amy (HCP). Patient states that she has lived a very fulfilled life and wants nothing more than to spend time with her family. Pt has a living will from 2013 but does not remember the specifics it includes, Amy will bring the documents to the hospital. Pt and family would like to see the results of the tissue biopsy and does plan to pursue whatever treatment is offered. MOLST was discussed and patient states having any artificial resuscitation of ventilation is not acceptable and does not align with her wishes. DNR/DNI order placed. Pt denies any symptoms at this time.

## 2022-10-27 NOTE — CONSULT NOTE ADULT - PROBLEM SELECTOR RECOMMENDATION 2
Palliative consulted for complex decision making in the setting of advanced malignancy.  See GOC discussion.  MOLST in chart  Pt DNR/DNI

## 2022-10-27 NOTE — CONSULT NOTE ADULT - CONSULT REQUESTED DATE/TIME
26-Oct-2022 14:16
26-Oct-2022 16:30
27-Oct-2022
26-Oct-2022 02:49
27-Oct-2022 07:55
27-Oct-2022 12:26
27-Oct-2022 10:00

## 2022-10-27 NOTE — CONSULT NOTE ADULT - NS ATTEND AMEND GEN_ALL_CORE FT
Patient is a pleasant 83yo F recently found to have lung mass and skull lesions, suggestive of primary lung cancer with mets. Patient is undergoing workup for new most likely cancer. Patient is very sharp, assertive and very clear about her wishes, has engaged with advanced care planning with her family. Quality of life, especially spending time with family is most important to her. Whenever she reaches end of life, she would like to die naturally in her own home, would want to be DNR/DNI. MOLST completed with her verbal consent. Palliative signing off as goals of care are clear at this time.

## 2022-10-27 NOTE — PROGRESS NOTE ADULT - PROBLEM SELECTOR PLAN 1
- CT Chest 10/19 with Spiculated 1.8 cm nodule in the right upper lobe with mediastinal and right hilar lymphadenopathy suggesting primary lung malignancy with metastatic disease. Indeterminant subcentimeter solid nodules in the left upper lobe.  - PET Scan from 10/13 significant for findings consistent with pulmonary lung carcinoma as indicated above  - Malignancy markers CEA, , CA-125 all elevated 09/2022  - Plan for Lung biopsy while admitted  - Oncology consulted  - interventional pulmonology consulted

## 2022-10-27 NOTE — PHYSICAL THERAPY INITIAL EVALUATION ADULT - ORIENTATION, REHAB EVAL
oriented to person, place, time and situation
Ears: no ear pain and no hearing problems.Nose: no nasal congestion and no nasal drainage.Mouth/Throat: no dysphagia, no hoarseness and no throat pain.Neck: no lumps, no pain, no stiffness and no swollen glands.

## 2022-10-27 NOTE — CONSULT NOTE ADULT - ATTENDING COMMENTS
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  Further work up with MRI PET revealed RUL lung lesion and mediastinal JANNET concerning for second primary.  Workup of first primary ongoing - patient with cervical abnormality not well visualized by IR.  Patient desires to go home.    #Scalp lesion with plans for radiation.  #RUL lung mass  #Mediastinal JANNET    - Patient now refused PICC, vascular team will call when ready to remove. 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  Further work up with MRI PET revealed RUL lung lesion and mediastinal JANNET concerning for second primary.  Patient with cervical abnormality s/p US, awaiting MRI Pelvis for further characterization.  Patient desires to go home.    #Scalp lesion with plans for radiation.  #RUL lung mass  #Mediastinal JANNET    - Patient scheduled for Bronchoscopy/EBUS on Tuesday, Nov 1  - She is asking to go home, will need to determine if can maintain same date for procedure if she were to be scheduled as outpatient  - Did not tolerate stereotactic measurements in prep for radiation well and may not be proceeding with treatments per discussion today    Michelle Silva MD

## 2022-10-27 NOTE — CONSULT NOTE ADULT - SUBJECTIVE AND OBJECTIVE BOX
HPI:  Patient is an 81yo F recently found to have lung mass and skull lesions, suggestive of primary lung cancer with mets, sent in for admission for lung biopsy and neuro eval for skull mets. Patient is pending ongoing workup, possible bronch. Palliative consulted for GOC.     PERTINENT PM/SXH:   Heart murmur    Chronic UTI    History of arthroscopy of shoulder    FAMILY HISTORY:  Family history of breast cancer (Sibling)    ------------------------------------------------------------------------------------------------------------  ITEMS NOT CHECKED ARE NOT PRESENT    SOCIAL HISTORY:   Living Situation: [ ]Home  [ ]Long term care  [ ]Rehab [ ]Other  Support:   Substance hx:  [ ]   Tobacco hx:  [ ]   Alcohol hx: [ ]   Family Hx substance abuse [ ]yes [ ]no  Episcopalian/Spirituality:    PCSSQ[Palliative Care Spiritual Screening Question]   Severity (0-10): 0  Score of 4 or > indicate consideration of Chaplaincy referral.  Chaplaincy Referral: [ ] yes [X ] refused [ ] following    Caregiver [place]? : [ ] yes [X ] no [ ] Deferred [ ] Declined               Social work referral [ ] Patient & Family Centered Care Referral [ ]    Anticipatory Grief present?:  [ ] yes [X ] no  [ ] Deferred                    Social work referral [ ] Patient & Family Centered Care Referral [ ]    ------------------------------------------------------------------------------------------------------------    PRESENT SYMPTOMS:  [ ] No     [ ] Yes   Source if other than patient:   [ ]Family   [ ]Team     [ ] Unable to self-report      [ ] CPOT (ICU)     [ ] PAINADs     [ ] RDOS    PAIN:   If blank, patient unable to specify   [ ]yes [ ]no  Location -                    Aggravating factors -  Quality -  Radiation -  Timing-  Pain at most severe level (0-10 scale):  Pain at minimal acceptable level (0-10 scale):     Dyspnea:                           [ ]Mild [ ]Moderate [ ]Severe  Anxiety:                             [ ]Mild [ ]Moderate [ ]Severe  Fatigue:                             [ ]Mild [ ]Moderate [ ]Severe  Nausea:                             [ ]Mild [ ]Moderate [ ]Severe  Loss of appetite:              [ ]Mild [ ]Moderate [ ]Severe  Constipation:                    [ ]Mild [ ]Moderate [ ]Severe    Other Symptoms:  [ ]All other review of systems negative     Home Medications for symptoms if any:  I-Stop Reference No:      ------------------------------------------------------------------------------------------------------------    FUNCTIONAL STATUS:     Baseline ADL (prior to admission):  [ ] Independent  [ ] Moderate Assistance [ ] Dependent  Palliative Performance Score:     [ ]PPSV2 < or = to 30%     NUTRITIONAL STATUS:     Protein Calorie Malnutrition Present:   [ ]mild   [ ]moderate   [ ]severe   [ ]poor nutritional intake   [ ]artificial nutrition      Weight:   [ ]underweight (BMI 18.5 or less)   [ ]morbid obesity (BMI 30 or higher)   [ ]anasarca  [ ]significant weight loss     Height (cm): 147.3 (10-26-22 @ 16:39), 148.41 (09-30-22 @ 13:00)  Weight (kg): 50.4 (10-26-22 @ 16:39), 51.7005 (09-30-22 @ 13:00)  BMI (kg/m2): 23.2 (10-26-22 @ 16:39), 23.5 (09-30-22 @ 13:00)    ------------------------------------------------------------------------------------------------------------    PRIOR ADVANCE DIRECTIVES:    [ ] DNR/MOLST    [ ] Living Will    [ ] Health Care Proxy(s)    DECISION MAKER(s):  [ ] Patient    [ ] Surrogate(s)  [ ]  HCP   [ ] Guardian           Name(s):     ------------------------------------------------------------------------------------------------------------  PHYSICAL EXAM:  Vital Signs Last 24 Hrs  T(C): 36.9 (27 Oct 2022 05:30), Max: 36.9 (26 Oct 2022 16:39)  T(F): 98.5 (27 Oct 2022 05:30), Max: 98.5 (26 Oct 2022 16:39)  HR: 66 (27 Oct 2022 05:30) (66 - 77)  BP: 120/62 (27 Oct 2022 05:30) (120/62 - 191/76)  BP(mean): --  RR: 16 (27 Oct 2022 05:30) (16 - 18)  SpO2: 99% (27 Oct 2022 05:30) (99% - 100%)    Parameters below as of 27 Oct 2022 05:30  Patient On (Oxygen Delivery Method): room air     I&O's Summary      GENERAL:  [ ]Cachexia  [ ] Frail  [ ]Awake  [ ]Oriented x   [ ]Lethargic  [ ]Unarousable  [ ]Verbal  [ ]Non-Verbal    BEHAVIORAL: [ ] Anxiety  [ ] Delirium [ ] Agitation [ ] Other    HEENT: [ ]Normal   [ ]Dry mouth   [ ]ET Tube/Trach  [ ]Oral lesions    PULMONARY:   [ ]Clear [ ]Tachypnea  [ ]Audible excessive secretions   [ ]Rhonchi        [ ]Right [ ]Left [ ]Bilateral  [ ]Crackles        [ ]Right [ ]Left [ ]Bilateral  [ ]Wheezing     [ ]Right [ ]Left [ ]Bilateral  [ ]Diminished breath sounds [ ]right [ ]left [ ]bilateral    CARDIOVASCULAR:    [ ]Regular [ ]Irregular [ ]Tachy  [ ]Ken [ ]Murmur [ ]Other    GASTROINTESTINAL:  [ ]Soft  [ ]Distended   [ ]+BS  [ ]Non tender [ ]Tender  [ ]Other [ ]PEG [ ]OGT/ NGT  Last BM:    GENITOURINARY:  [ ]Normal [ ] Incontinent   [ ]Oliguria/Anuria   [ ]Cash    MUSCULOSKELETAL:   [ ]Normal   [ ]Weakness  [ ]Bed/Wheelchair bound [ ]Edema    NEUROLOGIC:   [ ]No focal deficits  [ ]Cognitive impairment  [ ]Dysphagia [ ]Dysarthria [ ]Paresis [ ]Other     SKIN:   [ ]Normal  [ ]Rash  [ ]Other  [ ]Pressure ulcer(s)       Present on admission [ ]y [ ]n    ------------------------------------------------------------------------------------------------------------    LABS:                        10.9   14.70 )-----------( 266      ( 27 Oct 2022 06:14 )             33.1   10-27    136  |  100  |  25<H>  ----------------------------<  99  4.0   |  24  |  1.29    Ca    9.8      27 Oct 2022 06:14  Phos  3.8     10-27  Mg     1.90     10-27    TPro  8.2  /  Alb  4.1  /  TBili  <0.2  /  DBili  x   /  AST  22  /  ALT  12  /  AlkPhos  59  10-25  PT/INR - ( 26 Oct 2022 07:14 )   PT: 12.0 sec;   INR: 1.03 ratio         PTT - ( 26 Oct 2022 07:14 )  PTT:28.9 sec    ------------------------------------------------------------------------------------------------------------    RADIOLOGY & ADDITIONAL STUDIES:    < from: NM PET/CT Onc FDG Skull to Thigh, Inital (10.13.22 @ 15:25) >  IMPRESSION: Abnormal FDG-PET/CT scan.    1. Large FDG-avid destructive lesion in right parietal calvarium with an  associated soft tissue mass which extends to the scalp and   intracranially. Subadjacent to the scalp component of the lesion there is   an indeterminate semilunar area of photopenia. The lesion extends across   the midline, into the left posteriorparietal calvarium. There appears to   be minimal mass effect on the brain. An MRI is recommended for further   evaluation.    2. FDG-avid spiculated right upper lobe pulmonary nodule and FDG-avid   mediastinal and right perihilar lymph nodes are suspicious for primary   lung carcinoma with lymph node metastases. Minimally FDG-avid right upper   lobe groundglass opacity is nonspecific. Contrast-enhanced CT of chest is   recommended for further evaluation.    3. Predominantly photopenic pelvic mass with mild, peripheral FDG avidity   is indeterminate. Contrast-enhanced CT or MRI is recommended for further   evaluation.    4. Nonspecific, asymmetric, increased FDG activity involving the left   external ear. Please correlate with direct visualization.    5. Right extrarenal pelvis containing a 0.8 cm nonobstructing calculus.    < end of copied text >  < from: CT Chest w/ IV Cont (10.19.22 @ 13:49) >  IMPRESSION:    1.  Spiculated 1.8 cm nodule in the right upper lobe withmediastinal and   right hilar lymphadenopathy suggesting primary lung malignancy with   metastatic disease. Indeterminant subcentimeter solid nodules in the left   upper lobe.  2.  Hydrometra. Concern for cervical stenosis or mass. Recommend pelvic   ultrasound for further evaluation.    < end of copied text >  < from: CT Abdomen and Pelvis w/ Oral Cont and w/ IV Cont (10.19.22 @ 13:49) >  IMPRESSION:    1.  Spiculated 1.8 cm nodule in the right upper lobe withmediastinal and   right hilar lymphadenopathy suggesting primary lung malignancy with   metastatic disease. Indeterminant subcentimeter solid nodules in the left   upper lobe.  2.  Hydrometra. Concern for cervical stenosis or mass. Recommend pelvic   ultrasound for further evaluation.    < end of copied text >  < from: Xray Chest 1 View AP/PA (10.26.22 @ 00:39) >  FINDINGS:    The heart size is normal.  Round opacity in the right upper lung field measuring 2 cm, corresponds   to previously identified lung nodule.No focal consolidation.  There is no pneumothorax or pleural effusion.    IMPRESSION: Solitary right upper lobe nodule corresponding to suspected   lung neoplasm.    < end of copied text >  < from: US Pelvis Complete (US Pelvis Complete .) (10.26.22 @ 13:48) >    IMPRESSION:  A suboptimal transabdominal evaluation of the pelvis demonstrates a   markedly distended endometrial cavity. An MRI pelvis with gadolinium   would be helpful in better evaluation of the cervix for a focal lesion   has been scheduled.    < end of copied text >    ------------------------------------------------------------------------------------------------------------    ALLERGIES:  Allergies    No Known Allergies    Intolerances      MEDICATIONS  (STANDING):  ALPRAZolam 0.25 milliGRAM(s) Oral Once  influenza  Vaccine (HIGH DOSE) 0.7 milliLiter(s) IntraMuscular once    MEDICATIONS  (PRN):  acetaminophen     Tablet .. 650 milliGRAM(s) Oral every 6 hours PRN Mild Pain (1 - 3)  LORazepam     Tablet 0.5 milliGRAM(s) Oral at bedtime PRN Anxiety  melatonin 3 milliGRAM(s) Oral at bedtime PRN Insomnia    ------------------------------------------------------------------------------------------------------------    CRITICAL CARE:  [ ] Shock Present  [ ]Septic [ ]Cardiogenic [ ]Neurologic [ ]Hypovolemic  [ ]  Vasopressors [ ]  Inotropes   [ ]Respiratory failure present [ ]Mechanical ventilation [ ]Non-invasive ventilatory support [ ]High flow    [ ]Acute  [ ]Chronic [ ]Hypoxic  [ ]Hypercarbic [ ]Other  [ ]Other organ failure     Other REFERRALS:  [ ]Hospice  [ ]Child Life  [ ]Social Work  [ ]Case management [ ]Holistic Therapy    HPI:  Patient is an 83yo F recently found to have lung mass and skull lesions, suggestive of primary lung cancer with mets, sent in for admission for lung biopsy and neuro eval for skull mets. Patient is pending ongoing workup, possible bronch. Palliative consulted for GOC.     PERTINENT PM/SXH:   Heart murmur    Chronic UTI    History of arthroscopy of shoulder    FAMILY HISTORY:  Family history of breast cancer (Sibling)    ------------------------------------------------------------------------------------------------------------  ITEMS NOT CHECKED ARE NOT PRESENT    SOCIAL HISTORY:   Living Situation: [X]Home  [ ]Long term care  [ ]Rehab [ ]Other  Support:  Daughter Amy, Niece (lives withe patient)   Substance hx:  [ ]   Tobacco hx:  [ ]   Alcohol hx: [ ]   Family Hx substance abuse [ ]yes [ ]no  Lutheran/Spirituality:    PCSSQ[Palliative Care Spiritual Screening Question]   Severity (0-10): 0  Score of 4 or > indicate consideration of Chaplaincy referral.  Chaplaincy Referral: [ ] yes [X ] refused [ ] following    Caregiver [place]? : [ ] yes [X ] no [ ] Deferred [ ] Declined               Social work referral [ ] Patient & Family Centered Care Referral [ ]    Anticipatory Grief present?:  [ ] yes [X ] no  [ ] Deferred                    Social work referral [ ] Patient & Family Centered Care Referral [ ]    ------------------------------------------------------------------------------------------------------------    PRESENT SYMPTOMS:  [ ] No     [X ] Yes   Source if other than patient:   [ ]Family   [ ]Team     [ ] Unable to self-report      [ ] CPOT (ICU)     [ ] PAINADs     [ ] RDOS    PAIN:   If blank, patient unable to specify   [ ]yes [X]no  Location -                    Aggravating factors -  Quality -  Radiation -  Timing-  Pain at most severe level (0-10 scale):  Pain at minimal acceptable level (0-10 scale):     Dyspnea:                           [ ]Mild [ ]Moderate [ ]Severe  Anxiety:                             [X ]Mild [ ]Moderate [ ]Severe  Fatigue:                             [ ]Mild [ ]Moderate [ ]Severe  Nausea:                             [ ]Mild [ ]Moderate [ ]Severe  Loss of appetite:              [ ]Mild [ ]Moderate [ ]Severe  Constipation:                    [ ]Mild [ ]Moderate [ ]Severe    Other Symptoms:  [X ]All other review of systems negative     Home Medications for symptoms if any:  I-Stop Reference No:      ------------------------------------------------------------------------------------------------------------    FUNCTIONAL STATUS:     Baseline ADL (prior to admission):  [X ] Independent  [ ] Moderate Assistance [ ] Dependent  Palliative Performance Score:     [ ]PPSV2 < or = to 30%     NUTRITIONAL STATUS:     Protein Calorie Malnutrition Present:   [ ]mild   [ ]moderate   [ ]severe   [ ]poor nutritional intake   [ ]artificial nutrition      Weight:   [ ]underweight (BMI 18.5 or less)   [ ]morbid obesity (BMI 30 or higher)   [ ]anasarca  [ ]significant weight loss     Height (cm): 147.3 (10-26-22 @ 16:39), 148.41 (09-30-22 @ 13:00)  Weight (kg): 50.4 (10-26-22 @ 16:39), 51.7005 (09-30-22 @ 13:00)  BMI (kg/m2): 23.2 (10-26-22 @ 16:39), 23.5 (09-30-22 @ 13:00)    ------------------------------------------------------------------------------------------------------------    PRIOR ADVANCE DIRECTIVES:    [ ] DNR/MOLST    [ ] Living Will    [ ] Health Care Proxy(s)    DECISION MAKER(s):  [x] Patient    [ ] Surrogate(s)  [X ]  HCP   [ ] Guardian           Name(s): Amy (daughter)    ------------------------------------------------------------------------------------------------------------  PHYSICAL EXAM:  Vital Signs Last 24 Hrs  T(C): 36.9 (27 Oct 2022 05:30), Max: 36.9 (26 Oct 2022 16:39)  T(F): 98.5 (27 Oct 2022 05:30), Max: 98.5 (26 Oct 2022 16:39)  HR: 66 (27 Oct 2022 05:30) (66 - 77)  BP: 120/62 (27 Oct 2022 05:30) (120/62 - 191/76)  BP(mean): --  RR: 16 (27 Oct 2022 05:30) (16 - 18)  SpO2: 99% (27 Oct 2022 05:30) (99% - 100%)    Parameters below as of 27 Oct 2022 05:30  Patient On (Oxygen Delivery Method): room air     I&O's Summary      GENERAL:  [ ]Cachexia  [ ] Frail  [x ]Awake  [ x]Oriented x 3   [ ]Lethargic  [ ]Unarousable  [x ]Verbal  [ ]Non-Verbal    BEHAVIORAL: [X] Anxiety  [ ] Delirium [ ] Agitation [ ] Other    HEENT: [X]Normal   [ ]Dry mouth   [ ]ET Tube/Trach  [ ]Oral lesions    PULMONARY:   [X ]Clear [ ]Tachypnea  [ ]Audible excessive secretions   [ ]Rhonchi        [ ]Right [ ]Left [ ]Bilateral  [ ]Crackles        [ ]Right [ ]Left [ ]Bilateral  [ ]Wheezing     [ ]Right [ ]Left [ ]Bilateral  [ ]Diminished breath sounds [ ]right [ ]left [ ]bilateral    CARDIOVASCULAR:    [X ]Regular [ ]Irregular [ ]Tachy  [ ]Ken [ ]Murmur [ ]Other    GASTROINTESTINAL:  [X ]Soft  [ ]Distended   [ ]+BS  [ X]Non tender [ ]Tender  [ ]Other [ ]PEG [ ]OGT/ NGT  Last BM:    GENITOURINARY:  [X ]Normal [ ] Incontinent   [ ]Oliguria/Anuria   [ ]Cash    MUSCULOSKELETAL:   [ X]Normal   [ ]Weakness  [ ]Bed/Wheelchair bound [ ]Edema    NEUROLOGIC:   [ X]No focal deficits  [ ]Cognitive impairment  [ ]Dysphagia [ ]Dysarthria [ ]Paresis [ ]Other     SKIN:   [X ]Normal  [ ]Rash  [ ]Other  [ ]Pressure ulcer(s)       Present on admission [ ]y [ ]n    ------------------------------------------------------------------------------------------------------------    LABS:                        10.9   14.70 )-----------( 266      ( 27 Oct 2022 06:14 )             33.1   10-27    136  |  100  |  25<H>  ----------------------------<  99  4.0   |  24  |  1.29    Ca    9.8      27 Oct 2022 06:14  Phos  3.8     10-27  Mg     1.90     10-27    TPro  8.2  /  Alb  4.1  /  TBili  <0.2  /  DBili  x   /  AST  22  /  ALT  12  /  AlkPhos  59  10-25  PT/INR - ( 26 Oct 2022 07:14 )   PT: 12.0 sec;   INR: 1.03 ratio         PTT - ( 26 Oct 2022 07:14 )  PTT:28.9 sec    ------------------------------------------------------------------------------------------------------------    RADIOLOGY & ADDITIONAL STUDIES:    < from: NM PET/CT Onc FDG Skull to Thigh, Inital (10.13.22 @ 15:25) >  IMPRESSION: Abnormal FDG-PET/CT scan.    1. Large FDG-avid destructive lesion in right parietal calvarium with an  associated soft tissue mass which extends to the scalp and   intracranially. Subadjacent to the scalp component of the lesion there is   an indeterminate semilunar area of photopenia. The lesion extends across   the midline, into the left posteriorparietal calvarium. There appears to   be minimal mass effect on the brain. An MRI is recommended for further   evaluation.    2. FDG-avid spiculated right upper lobe pulmonary nodule and FDG-avid   mediastinal and right perihilar lymph nodes are suspicious for primary   lung carcinoma with lymph node metastases. Minimally FDG-avid right upper   lobe groundglass opacity is nonspecific. Contrast-enhanced CT of chest is   recommended for further evaluation.    3. Predominantly photopenic pelvic mass with mild, peripheral FDG avidity   is indeterminate. Contrast-enhanced CT or MRI is recommended for further   evaluation.    4. Nonspecific, asymmetric, increased FDG activity involving the left   external ear. Please correlate with direct visualization.    5. Right extrarenal pelvis containing a 0.8 cm nonobstructing calculus.    < end of copied text >  < from: CT Chest w/ IV Cont (10.19.22 @ 13:49) >  IMPRESSION:    1.  Spiculated 1.8 cm nodule in the right upper lobe withmediastinal and   right hilar lymphadenopathy suggesting primary lung malignancy with   metastatic disease. Indeterminant subcentimeter solid nodules in the left   upper lobe.  2.  Hydrometra. Concern for cervical stenosis or mass. Recommend pelvic   ultrasound for further evaluation.    < end of copied text >  < from: CT Abdomen and Pelvis w/ Oral Cont and w/ IV Cont (10.19.22 @ 13:49) >  IMPRESSION:    1.  Spiculated 1.8 cm nodule in the right upper lobe withmediastinal and   right hilar lymphadenopathy suggesting primary lung malignancy with   metastatic disease. Indeterminant subcentimeter solid nodules in the left   upper lobe.  2.  Hydrometra. Concern for cervical stenosis or mass. Recommend pelvic   ultrasound for further evaluation.    < end of copied text >  < from: Xray Chest 1 View AP/PA (10.26.22 @ 00:39) >  FINDINGS:    The heart size is normal.  Round opacity in the right upper lung field measuring 2 cm, corresponds   to previously identified lung nodule.No focal consolidation.  There is no pneumothorax or pleural effusion.    IMPRESSION: Solitary right upper lobe nodule corresponding to suspected   lung neoplasm.    < end of copied text >  < from: US Pelvis Complete (US Pelvis Complete .) (10.26.22 @ 13:48) >    IMPRESSION:  A suboptimal transabdominal evaluation of the pelvis demonstrates a   markedly distended endometrial cavity. An MRI pelvis with gadolinium   would be helpful in better evaluation of the cervix for a focal lesion   has been scheduled.    < end of copied text >    ------------------------------------------------------------------------------------------------------------    ALLERGIES:  Allergies    No Known Allergies    Intolerances      MEDICATIONS  (STANDING):  ALPRAZolam 0.25 milliGRAM(s) Oral Once  influenza  Vaccine (HIGH DOSE) 0.7 milliLiter(s) IntraMuscular once    MEDICATIONS  (PRN):  acetaminophen     Tablet .. 650 milliGRAM(s) Oral every 6 hours PRN Mild Pain (1 - 3)  LORazepam     Tablet 0.5 milliGRAM(s) Oral at bedtime PRN Anxiety  melatonin 3 milliGRAM(s) Oral at bedtime PRN Insomnia    ------------------------------------------------------------------------------------------------------------    CRITICAL CARE:  [ ] Shock Present  [ ]Septic [ ]Cardiogenic [ ]Neurologic [ ]Hypovolemic  [ ]  Vasopressors [ ]  Inotropes   [ ]Respiratory failure present [ ]Mechanical ventilation [ ]Non-invasive ventilatory support [ ]High flow    [ ]Acute  [ ]Chronic [ ]Hypoxic  [ ]Hypercarbic [ ]Other  [ ]Other organ failure     Other REFERRALS:  [ ]Hospice  [ ]Child Life  [ ]Social Work  [ ]Case management [ ]Holistic Therapy    HPI:  Patient is an 81yo F recently found to have lung mass and skull lesions, suggestive of primary lung cancer with mets, sent in for admission for lung biopsy and neuro eval for skull mets. Patient is pending ongoing workup, possible bronch. Palliative consulted for GOC.     PERTINENT PM/SXH:   Heart murmur    Chronic UTI    History of arthroscopy of shoulder    FAMILY HISTORY:  Family history of breast cancer (Sibling)    ------------------------------------------------------------------------------------------------------------  ITEMS NOT CHECKED ARE NOT PRESENT    SOCIAL HISTORY:   Living Situation: [X]Home  [ ]Long term care  [ ]Rehab [ ]Other  Support:  Daughter Amy (RN in [place]), Niece (lives withe patient). Estranged from son   Substance hx:  [ ]   Tobacco hx:  [ ]   Alcohol hx: [ ]   Family Hx substance abuse [ ]yes [ ]no  Mosque/Spirituality: None    PCSSQ[Palliative Care Spiritual Screening Question]   Severity (0-10): 0  Score of 4 or > indicate consideration of Chaplaincy referral.  Chaplaincy Referral: [ ] yes [X ] refused [ ] following    Caregiver [place]? : [ ] yes [X ] no [ ] Deferred [ ] Declined               Social work referral [ ] Patient & Family Centered Care Referral [ ]    Anticipatory Grief present?:  [ ] yes [X ] no  [ ] Deferred                    Social work referral [ ] Patient & Family Centered Care Referral [ ]    ------------------------------------------------------------------------------------------------------------    PRESENT SYMPTOMS:  [ ] No     [X ] Yes   Source if other than patient:   [ ]Family   [ ]Team     [ ] Unable to self-report      [ ] CPOT (ICU)     [ ] PAINADs     [ ] RDOS    PAIN:   If blank, patient unable to specify   [ ]yes [X]no  Location -                    Aggravating factors -  Quality -  Radiation -  Timing-  Pain at most severe level (0-10 scale):  Pain at minimal acceptable level (0-10 scale):     Dyspnea:                           [ ]Mild [ ]Moderate [ ]Severe  Anxiety:                             [X ]Mild [ ]Moderate [ ]Severe  Fatigue:                             [ ]Mild [ ]Moderate [ ]Severe  Nausea:                             [ ]Mild [ ]Moderate [ ]Severe  Loss of appetite:              [ ]Mild [ ]Moderate [ ]Severe  Constipation:                    [ ]Mild [ ]Moderate [ ]Severe    Other Symptoms:  [X ]All other review of systems negative     iSTOP reference: [id number]   ------------------------------------------------------------------------------------------------------------    FUNCTIONAL STATUS:     Baseline ADL (prior to admission):  [X ] Independent  [ ] Moderate Assistance [ ] Dependent  Palliative Performance Score:     [ ]PPSV2 < or = to 30%     NUTRITIONAL STATUS:     Protein Calorie Malnutrition Present:   [ ]mild   [ ]moderate   [ ]severe   [ ]poor nutritional intake   [ ]artificial nutrition      Weight:   [ ]underweight (BMI 18.5 or less)   [ ]morbid obesity (BMI 30 or higher)   [ ]anasarca  [ ]significant weight loss     Height (cm): 147.3 (10-26-22 @ 16:39), 148.41 (09-30-22 @ 13:00)  Weight (kg): 50.4 (10-26-22 @ 16:39), 51.7005 (09-30-22 @ 13:00)  BMI (kg/m2): 23.2 (10-26-22 @ 16:39), 23.5 (09-30-22 @ 13:00)    ------------------------------------------------------------------------------------------------------------    PRIOR ADVANCE DIRECTIVES:    [  ] DNR/MOLST    [X ] Living Will    [X ] Health Care Proxy(s)- Daughter Amy     DECISION MAKER(s):  [x] Patient    [ ] Surrogate(s)  [ ]  HCP   [ ] Guardian           Name(s): Amy (daughter)    ------------------------------------------------------------------------------------------------------------  PHYSICAL EXAM:  Vital Signs Last 24 Hrs  T(C): 36.9 (27 Oct 2022 05:30), Max: 36.9 (26 Oct 2022 16:39)  T(F): 98.5 (27 Oct 2022 05:30), Max: 98.5 (26 Oct 2022 16:39)  HR: 66 (27 Oct 2022 05:30) (66 - 77)  BP: 120/62 (27 Oct 2022 05:30) (120/62 - 191/76)  BP(mean): --  RR: 16 (27 Oct 2022 05:30) (16 - 18)  SpO2: 99% (27 Oct 2022 05:30) (99% - 100%)    Parameters below as of 27 Oct 2022 05:30  Patient On (Oxygen Delivery Method): room air     I&O's Summary      GENERAL:  [ ]Cachexia  [ ] Frail  [x ]Awake  [ x]Oriented x 3   [ ]Lethargic  [ ]Unarousable  [x ]Verbal  [ ]Non-Verbal    BEHAVIORAL: [X] Anxiety  [ ] Delirium [ ] Agitation [ ] Other    HEENT: [X]Normal   [ ]Dry mouth   [ ]ET Tube/Trach  [ ]Oral lesions    PULMONARY:   [X ]Clear [ ]Tachypnea  [ ]Audible excessive secretions   [ ]Rhonchi        [ ]Right [ ]Left [ ]Bilateral  [ ]Crackles        [ ]Right [ ]Left [ ]Bilateral  [ ]Wheezing     [ ]Right [ ]Left [ ]Bilateral  [ ]Diminished breath sounds [ ]right [ ]left [ ]bilateral    CARDIOVASCULAR:    [X ]Regular [ ]Irregular [ ]Tachy  [ ]Ken [ ]Murmur [ ]Other    GASTROINTESTINAL:  [X ]Soft  [ ]Distended   [ ]+BS  [ X]Non tender [ ]Tender  [ ]Other [ ]PEG [ ]OGT/ NGT  Last BM:    GENITOURINARY:  [X ]Normal [ ] Incontinent   [ ]Oliguria/Anuria   [ ]Cash    MUSCULOSKELETAL:   [ X]Normal   [ ]Weakness  [ ]Bed/Wheelchair bound [ ]Edema    NEUROLOGIC:   [ X]No focal deficits  [ ]Cognitive impairment  [ ]Dysphagia [ ]Dysarthria [ ]Paresis [ ]Other     SKIN:   [X ]Normal  [ ]Rash  [ ]Other  [ ]Pressure ulcer(s)       Present on admission [ ]y [ ]n    ------------------------------------------------------------------------------------------------------------    LABS:                        10.9   14.70 )-----------( 266      ( 27 Oct 2022 06:14 )             33.1   10-27    136  |  100  |  25<H>  ----------------------------<  99  4.0   |  24  |  1.29    Ca    9.8      27 Oct 2022 06:14  Phos  3.8     10-27  Mg     1.90     10-27    TPro  8.2  /  Alb  4.1  /  TBili  <0.2  /  DBili  x   /  AST  22  /  ALT  12  /  AlkPhos  59  10-25  PT/INR - ( 26 Oct 2022 07:14 )   PT: 12.0 sec;   INR: 1.03 ratio         PTT - ( 26 Oct 2022 07:14 )  PTT:28.9 sec    ------------------------------------------------------------------------------------------------------------    RADIOLOGY & ADDITIONAL STUDIES:    < from: NM PET/CT Onc FDG Skull to Thigh, Inital (10.13.22 @ 15:25) >  IMPRESSION: Abnormal FDG-PET/CT scan.    1. Large FDG-avid destructive lesion in right parietal calvarium with an  associated soft tissue mass which extends to the scalp and   intracranially. Subadjacent to the scalp component of the lesion there is   an indeterminate semilunar area of photopenia. The lesion extends across   the midline, into the left posteriorparietal calvarium. There appears to   be minimal mass effect on the brain. An MRI is recommended for further   evaluation.    2. FDG-avid spiculated right upper lobe pulmonary nodule and FDG-avid   mediastinal and right perihilar lymph nodes are suspicious for primary   lung carcinoma with lymph node metastases. Minimally FDG-avid right upper   lobe groundglass opacity is nonspecific. Contrast-enhanced CT of chest is   recommended for further evaluation.    3. Predominantly photopenic pelvic mass with mild, peripheral FDG avidity   is indeterminate. Contrast-enhanced CT or MRI is recommended for further   evaluation.    4. Nonspecific, asymmetric, increased FDG activity involving the left   external ear. Please correlate with direct visualization.    5. Right extrarenal pelvis containing a 0.8 cm nonobstructing calculus.    < end of copied text >  < from: CT Chest w/ IV Cont (10.19.22 @ 13:49) >  IMPRESSION:    1.  Spiculated 1.8 cm nodule in the right upper lobe withmediastinal and   right hilar lymphadenopathy suggesting primary lung malignancy with   metastatic disease. Indeterminant subcentimeter solid nodules in the left   upper lobe.  2.  Hydrometra. Concern for cervical stenosis or mass. Recommend pelvic   ultrasound for further evaluation.    < end of copied text >  < from: CT Abdomen and Pelvis w/ Oral Cont and w/ IV Cont (10.19.22 @ 13:49) >  IMPRESSION:    1.  Spiculated 1.8 cm nodule in the right upper lobe withmediastinal and   right hilar lymphadenopathy suggesting primary lung malignancy with   metastatic disease. Indeterminant subcentimeter solid nodules in the left   upper lobe.  2.  Hydrometra. Concern for cervical stenosis or mass. Recommend pelvic   ultrasound for further evaluation.    < end of copied text >  < from: Xray Chest 1 View AP/PA (10.26.22 @ 00:39) >  FINDINGS:    The heart size is normal.  Round opacity in the right upper lung field measuring 2 cm, corresponds   to previously identified lung nodule.No focal consolidation.  There is no pneumothorax or pleural effusion.    IMPRESSION: Solitary right upper lobe nodule corresponding to suspected   lung neoplasm.    < end of copied text >  < from: US Pelvis Complete (US Pelvis Complete .) (10.26.22 @ 13:48) >    IMPRESSION:  A suboptimal transabdominal evaluation of the pelvis demonstrates a   markedly distended endometrial cavity. An MRI pelvis with gadolinium   would be helpful in better evaluation of the cervix for a focal lesion   has been scheduled.    < end of copied text >    ------------------------------------------------------------------------------------------------------------    ALLERGIES:  Allergies    No Known Allergies    Intolerances      MEDICATIONS  (STANDING):  ALPRAZolam 0.25 milliGRAM(s) Oral Once  influenza  Vaccine (HIGH DOSE) 0.7 milliLiter(s) IntraMuscular once    MEDICATIONS  (PRN):  acetaminophen     Tablet .. 650 milliGRAM(s) Oral every 6 hours PRN Mild Pain (1 - 3)  LORazepam     Tablet 0.5 milliGRAM(s) Oral at bedtime PRN Anxiety  melatonin 3 milliGRAM(s) Oral at bedtime PRN Insomnia    ------------------------------------------------------------------------------------------------------------    CRITICAL CARE:  [ ] Shock Present  [ ]Septic [ ]Cardiogenic [ ]Neurologic [ ]Hypovolemic  [ ]  Vasopressors [ ]  Inotropes   [ ]Respiratory failure present [ ]Mechanical ventilation [ ]Non-invasive ventilatory support [ ]High flow    [ ]Acute  [ ]Chronic [ ]Hypoxic  [ ]Hypercarbic [ ]Other  [ ]Other organ failure     Other REFERRALS:  [ ]Hospice  [ ]Child Life  [ ]Social Work  [ ]Case management [ ]Holistic Therapy

## 2022-10-27 NOTE — CHART NOTE - NSCHARTNOTEFT_GEN_A_CORE
[facility] team spoke with neuro-oncologist, Dr. Min, who recommended starting Keppra 500mg twice daily for seizure prophylaxis.  Order placed and team contacted.
Patient seen and examined at bedside with daughter, patient is neuro intact with prominent R occipital scalp lesion. Patient understands that this lesion involves a significant portion of her skull and its removal would require craniectomy and likely plastics involvement for muscle flap. She understands that without scalp repair radiation may not be feasible. However, patient and daughter would not want surgical intervention for the skull/scalp. They would like to move forward with oncological workup and get surgical oncology input for further management of her scalp but would not want surgery.     No further neurosurgical intervention at this time, will sign off.
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 Guarded prognosis.    Given morbidity and associated complication risk of lung biopsy, IR recommends against biopsy at this time unless specifically requested by oncology.    IF HEME/ONC requests additional tissue via lung biopsy, please reconsult IR at that time.    d/w IR attendings Dr. Lennon and Dr. Donte Barcenas MD  PGY-3, Interventional Radiology    -Available on [employer] TEAMS for all non-urgent questions  -Emergent issues: [facility]-p.[phone]; [facility]-p.[phone] ([phone])  -Non-emergent consults: Please place a [facility] order "Consult-Interventional Radiology" with an appropriate callback number  -Scheduling questions: [facility]: [phone]; [facility]: [phone]  -Clinic/Outpatient booking: [facility]: [phone]; [facility]: [phone]

## 2022-10-27 NOTE — CONSULT NOTE ADULT - PROBLEM SELECTOR RECOMMENDATION 3
For any uncontrolled symptoms please page [phone]  Pt is DNR/DNI Palliative consulted for GOC   Pt is DNR/DNI, continuing all available medical interventions   Palliative signing off

## 2022-10-27 NOTE — CONSULT NOTE ADULT - SUBJECTIVE AND OBJECTIVE BOX
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 pt had biopsy of scalp lesion a few weeks ago and was found to have metastatic adenoca, had CTs, MRI PEt scan last week and found ot have primary lung ca, pt was sent in for admission for biopsy and neuro eval, currently has no complaints other than anxiety, due for her home xanax    Frail female has been a chronic smoker and continues to smoke  Daughter present  requested to evaluate scalp nodule which has been biopsied and is metastatic carcinoma.   (26 Oct 2022 04:49)      PAST MEDICAL & SURGICAL HISTORY:  Heart murmur  as a child      Chronic UTI  since 10/13      History of arthroscopy of shoulder  left shoulder 2009          REVIEW OF SYSTEMS      General: frail elderly female , NAD	    MEDICATIONS  (STANDING):  ALPRAZolam 0.25 milliGRAM(s) Oral Once  influenza  Vaccine (HIGH DOSE) 0.7 milliLiter(s) IntraMuscular once    MEDICATIONS  (PRN):  acetaminophen     Tablet .. 650 milliGRAM(s) Oral every 6 hours PRN Mild Pain (1 - 3)  LORazepam     Tablet 0.5 milliGRAM(s) Oral at bedtime PRN Anxiety  melatonin 3 milliGRAM(s) Oral at bedtime PRN Insomnia      Allergies    No Known Allergies    Intolerances        SOCIAL HISTORY: chronic and current smoker    FAMILY HISTORY:  Family history of breast cancer (Sibling)        Vital Signs Last 24 Hrs  T(C): 36.9 (27 Oct 2022 05:30), Max: 36.9 (26 Oct 2022 16:39)  T(F): 98.5 (27 Oct 2022 05:30), Max: 98.5 (26 Oct 2022 16:39)  HR: 66 (27 Oct 2022 05:30) (66 - 77)  BP: 120/62 (27 Oct 2022 05:30) (120/62 - 191/76)  BP(mean): --  RR: 16 (27 Oct 2022 05:30) (16 - 18)  SpO2: 99% (27 Oct 2022 05:30) (99% - 100%)    Parameters below as of 27 Oct 2022 05:30  Patient On (Oxygen Delivery Method): room air        PHYSICAL EXAM:      Constitutional: awake and alert , not sob while sitting  4 X 3 cm occipital scalp nodule, not currently bleeding, with suture in place at biopsy site  No open wounds    Advise - may wash hair as needed, and place Allevyn foam with border over nodule q 1-2 days depending on drainage  d/w patient and daughter        LABS:                        10.9   14.70 )-----------( 266      ( 27 Oct 2022 06:14 )             33.1     10-27    136  |  100  |  25<H>  ----------------------------<  99  4.0   |  24  |  1.29    Ca    9.8      27 Oct 2022 06:14  Phos  3.8     10-27  Mg     1.90     10-27    TPro  8.2  /  Alb  4.1  /  TBili  <0.2  /  DBili  x   /  AST  22  /  ALT  12  /  AlkPhos  59  10-25    PT/INR - ( 26 Oct 2022 07:14 )   PT: 12.0 sec;   INR: 1.03 ratio         PTT - ( 26 Oct 2022 07:14 )  PTT:28.9 sec      Albumin, Serum: 4.1 g/dL (10-25 @ 23:56)

## 2022-10-27 NOTE — PROGRESS NOTE ADULT - SUBJECTIVE AND OBJECTIVE BOX
INTERVAL HPI/OVERNIGHT EVENTS:  Patient seen at bedside.  No acute complaints  Accompanied by her dtr      VITAL SIGNS:  T(F): 98.4 (10-27-22 @ 13:23)  HR: 80 (10-27-22 @ 13:23)  BP: 190/82 (10-27-22 @ 13:23)  RR: 17 (10-27-22 @ 13:23)  SpO2: 100% (10-27-22 @ 13:23)  Wt(kg): --    PHYSICAL EXAM:  GENERAL: NAD, lying in bed comfortably  HEAD:  Atraumatic, normocephalic  EYES: EOMI, PERRLA, conjunctiva and sclera clear  ENT: Moist mucous membranes  NECK: Supple, no JVD  HEART: Regular rate and rhythm, no murmurs, rubs, or gallops  LUNGS: Unlabored respirations.  Clear to auscultation bilaterally, no crackles, wheezing, or rhonchi  ABDOMEN: Soft, nontender, nondistended, +BS  EXTREMITIES: 2+ peripheral pulses bilaterally. No clubbing, cyanosis, or edema  NERVOUS SYSTEM:  A&Ox3, no focal deficits   SKIN: 2x2cm indurated hard lesion on the right parietal scalp, with crusting and flaking. No discharge, no oozing      MEDICATIONS  (STANDING):  influenza  Vaccine (HIGH DOSE) 0.7 milliLiter(s) IntraMuscular once    MEDICATIONS  (PRN):  acetaminophen     Tablet .. 650 milliGRAM(s) Oral every 6 hours PRN Mild Pain (1 - 3)  ALPRAZolam 0.5 milliGRAM(s) Oral every 8 hours PRN anxiety  melatonin 3 milliGRAM(s) Oral at bedtime PRN Insomnia  morphine  - Injectable 2 milliGRAM(s) IV Push every 24 hours PRN prior to RT      Allergies    No Known Allergies    Intolerances        LABS:                        10.9   14.70 )-----------( 266      ( 27 Oct 2022 06:14 )             33.1     10-27    136  |  100  |  25<H>  ----------------------------<  99  4.0   |  24  |  1.29    Ca    9.8      27 Oct 2022 06:14  Phos  3.8     10-27  Mg     1.90     10-27    TPro  8.2  /  Alb  4.1  /  TBili  <0.2  /  DBili  x   /  AST  22  /  ALT  12  /  AlkPhos  59  10-25    PT/INR - ( 26 Oct 2022 07:14 )   PT: 12.0 sec;   INR: 1.03 ratio         PTT - ( 26 Oct 2022 07:14 )  PTT:28.9 sec      RADIOLOGY & ADDITIONAL TESTS:  Studies reviewed.

## 2022-10-27 NOTE — CONSULT NOTE ADULT - ASSESSMENT
Patient is an 81yo F recently found to have lung mass and skull lesions, suggestive of primary lung cancer with mets, sent in for admission for lung biopsy and neuro eval for skull mets. Patient is pending ongoing workup, possible bronch. Palliative consulted for GOC.

## 2022-10-27 NOTE — CONSULT NOTE ADULT - SUBJECTIVE AND OBJECTIVE BOX
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  A biopsy of a scalp lesion several weeks ago revealed metastatic adenocarcinoma.  Further work up with MRI PET was concerning for primary lung cancer.  Pulmonary was consulted for further evaluation.    On exam patient states..           PAST MEDICAL & SURGICAL HISTORY:  Heart murmur  as a child      Chronic UTI  since 10/13      History of arthroscopy of shoulder  left shoulder 2009          FAMILY HISTORY:  Family history of breast cancer (Sibling)        SOCIAL HISTORY:  Smoking: [ ] Never Smoked [ ] Former Smoker (__ packs x ___ years) [x ] Current Smoker  (__ packs x ___ years)    Allergies    No Known Allergies    Intolerances        HOME MEDICATIONS:  Home Medications:  LORazepam 0.5 mg oral tablet: 1 tab(s) orally once a day (at bedtime) (26 Oct 2022 15:32)      REVIEW OF SYSTEMS:  Constitutional: [ ] negative [ ] fevers [ ] chills [ ] weight loss [ ] weight gain--anxiety  HEENT: [ ] negative [ ] dry eyes [ ] eye irritation [ ] postnasal drip [ ] nasal congestion  CV: [ ] negative  [ ] chest pain [ ] orthopnea [ ] palpitations [ ] murmur  Resp: [ ] negative [ ] cough [ ] shortness of breath [ ] dyspnea [ ] wheezing [ ] sputum [ ] hemoptysis  GI: [ ] negative [ ] nausea [ ] vomiting [ ] diarrhea [ ] constipation [ ] abd pain [ ] dysphagia   : [ ] negative [ ] dysuria [ ] nocturia [ ] hematuria [ ] increased urinary frequency  Musculoskeletal: [ ] negative [ ] back pain [ ] myalgias [ ] arthralgias [ ] fracture  Skin: [ ] negative [ ] rash [ ] itch  Neurological: [ ] negative [ ] headache [ ] dizziness [ ] syncope [ ] weakness [ ] numbness  Psychiatric: [ ] negative [ ] anxiety [ ] depression  Endocrine: [ ] negative [ ] diabetes [ ] thyroid problem  Hematologic/Lymphatic: [ ] negative [ ] anemia [ ] bleeding problem  Allergic/Immunologic: [ ] negative [ ] itchy eyes [ ] nasal discharge [ ] hives [ ] angioedema  [x ] All other systems negative  [ ] Unable to assess ROS because ________    OBJECTIVE:  ICU Vital Signs Last 24 Hrs  T(C): 36.9 (27 Oct 2022 05:30), Max: 36.9 (26 Oct 2022 16:39)  T(F): 98.5 (27 Oct 2022 05:30), Max: 98.5 (26 Oct 2022 16:39)  HR: 66 (27 Oct 2022 05:30) (66 - 77)  BP: 120/62 (27 Oct 2022 05:30) (120/62 - 191/76)  BP(mean): --  ABP: --  ABP(mean): --  RR: 16 (27 Oct 2022 05:30) (16 - 18)  SpO2: 99% (27 Oct 2022 05:30) (99% - 100%)    O2 Parameters below as of 27 Oct 2022 05:30  Patient On (Oxygen Delivery Method): room air              CAPILLARY BLOOD GLUCOSE          PHYSICAL EXAM:  GENERAL: NAD, lying in bed comfortably  HEAD:  Atraumatic, normocephalic  EYES: EOMI, PERRLA, conjunctiva and sclera clear  ENT: Moist mucous membranes  NECK: Supple, no JVD  HEART: Regular rate and rhythm, no murmurs, rubs, or gallops  LUNGS: Unlabored respirations.  Clear to auscultation bilaterally, no crackles, wheezing, or rhonchi  ABDOMEN: Soft, nontender, nondistended, +BS  EXTREMITIES: 2+ peripheral pulses bilaterally. No clubbing, cyanosis, or edema  NERVOUS SYSTEM:  A&Ox3, no focal deficits   SKIN: 2x2cm indurated hard lesion on the right parietal scalp, with crusting and flaking. No discharge, no oozing  Psych: Normal. normal behavior. normal speech    LINES:     HOSPITAL MEDICATIONS:  Standing Meds:  ALPRAZolam 0.25 milliGRAM(s) Oral Once  influenza  Vaccine (HIGH DOSE) 0.7 milliLiter(s) IntraMuscular once      PRN Meds:  acetaminophen     Tablet .. 650 milliGRAM(s) Oral every 6 hours PRN  LORazepam     Tablet 0.5 milliGRAM(s) Oral at bedtime PRN  melatonin 3 milliGRAM(s) Oral at bedtime PRN      LABS:                        10.9   14.70 )-----------( 266      ( 27 Oct 2022 06:14 )             33.1     Hgb Trend: 10.9<--, 11.7<--  10-25    133<L>  |  97<L>  |  32<H>  ----------------------------<  98  4.3   |  24  |  1.63<H>    Ca    10.2      25 Oct 2022 23:56  Phos  3.4     10-26  Mg     2.00     10-26    TPro  8.2  /  Alb  4.1  /  TBili  <0.2  /  DBili  x   /  AST  22  /  ALT  12  /  AlkPhos  59  10-25    Creatinine Trend: 1.63<--  PT/INR - ( 26 Oct 2022 07:14 )   PT: 12.0 sec;   INR: 1.03 ratio         PTT - ( 26 Oct 2022 07:14 )  PTT:28.9 sec          MICROBIOLOGY:       RADIOLOGY:  [ ] Reviewed and interpreted by me    PULMONARY FUNCTION TESTS:    EKG:   CHIEF COMPLAINT:  lung mass     HPI:  Phyllis Milian is an 81yo female who was recently found to have 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  A biopsy of a scalp lesion several weeks ago revealed metastatic adenocarcinoma.  Further work up with MRI PET was concerning for primary lung cancer.  Pulmonary was consulted for further evaluation.    On exam patient states that she had previously been feeling well until April of this year when she noticed a lesion on the back of her head.  She states that initially she went to dermatology and was treated for psoriasis but the lesion only worsened.  Afterwards it was felt that the lesion may represent a cyst and when she went for surgical drainage at the end of August she was told by the surgeon during the attempted I&D that this was not a cyst, at which point tissue was sent for review which demonstrated adenocarcinoma of possible GI origin.  She otherwise has felt well up until this point and denies weight loss, fevers, chills, cough, and shortness of breath.  She has smoked a pack a day for approximately 70 years and continues to do so.  She denies any personal or family history of lung disease.  Her twin sister, who was also a heavy smoker,  of breast cancer approximately 11 years ago.          PAST MEDICAL & SURGICAL HISTORY:  Heart murmur  as a child      Chronic UTI  since 10/13      History of arthroscopy of shoulder  left shoulder           FAMILY HISTORY:  Family history of breast cancer (Sibling)        SOCIAL HISTORY:  Smoking: [ ] Never Smoked [ ] Former Smoker (__ packs x ___ years) [x ] Current Smoker  (__ packs x ___ years)    Allergies    No Known Allergies    Intolerances        HOME MEDICATIONS:  Home Medications:  LORazepam 0.5 mg oral tablet: 1 tab(s) orally once a day (at bedtime) (26 Oct 2022 15:32)      REVIEW OF SYSTEMS:  Constitutional: [ ] negative [ ] fevers [ ] chills [ ] weight loss [ ] weight gain--anxiety  HEENT: [ ] negative [ ] dry eyes [ ] eye irritation [ ] postnasal drip [ ] nasal congestion  CV: [ ] negative  [ ] chest pain [ ] orthopnea [ ] palpitations [ ] murmur  Resp: [ ] negative [ ] cough [ ] shortness of breath [ ] dyspnea [ ] wheezing [ ] sputum [ ] hemoptysis  GI: [ ] negative [ ] nausea [ ] vomiting [ ] diarrhea [ ] constipation [ ] abd pain [ ] dysphagia   : [ ] negative [ ] dysuria [ ] nocturia [ ] hematuria [ ] increased urinary frequency  Musculoskeletal: [ ] negative [ ] back pain [ ] myalgias [ ] arthralgias [ ] fracture  Skin: [ ] negative [ ] rash [ ] itch  Neurological: [ ] negative [ ] headache [ ] dizziness [ ] syncope [ ] weakness [ ] numbness  Psychiatric: [ ] negative [ ] anxiety [ ] depression  Endocrine: [ ] negative [ ] diabetes [ ] thyroid problem  Hematologic/Lymphatic: [ ] negative [ ] anemia [ ] bleeding problem  Allergic/Immunologic: [ ] negative [ ] itchy eyes [ ] nasal discharge [ ] hives [ ] angioedema  [x ] All other systems negative  [ ] Unable to assess ROS because ________    OBJECTIVE:  ICU Vital Signs Last 24 Hrs  T(C): 36.9 (27 Oct 2022 05:30), Max: 36.9 (26 Oct 2022 16:39)  T(F): 98.5 (27 Oct 2022 05:30), Max: 98.5 (26 Oct 2022 16:39)  HR: 66 (27 Oct 2022 05:30) (66 - 77)  BP: 120/62 (27 Oct 2022 05:30) (120/62 - 191/76)  BP(mean): --  ABP: --  ABP(mean): --  RR: 16 (27 Oct 2022 05:30) (16 - 18)  SpO2: 99% (27 Oct 2022 05:30) (99% - 100%)    O2 Parameters below as of 27 Oct 2022 05:30  Patient On (Oxygen Delivery Method): room air              CAPILLARY BLOOD GLUCOSE          PHYSICAL EXAM:  GENERAL: NAD, lying in bed comfortably  HEAD:  Atraumatic, normocephalic, with right sided erythematous, indurated lesion on scalp   EYES: EOMI, PERRLA, conjunctiva and sclera clear  ENT: Moist mucous membranes  NECK: Supple, no JVD  HEART: Regular rate and rhythm, no murmurs, rubs, or gallops  LUNGS: Unlabored respirations.  Clear to auscultation bilaterally, no crackles, wheezing, or rhonchi  ABDOMEN: Soft, nontender, nondistended, +BS  EXTREMITIES: 2+ peripheral pulses bilaterally. No clubbing, cyanosis, or edema  NERVOUS SYSTEM:  A&Ox3, no focal deficits   SKIN: no rashes  Psych: Normal. normal behavior. normal speech    LINES:     HOSPITAL MEDICATIONS:  Standing Meds:  ALPRAZolam 0.25 milliGRAM(s) Oral Once  influenza  Vaccine (HIGH DOSE) 0.7 milliLiter(s) IntraMuscular once      PRN Meds:  acetaminophen     Tablet .. 650 milliGRAM(s) Oral every 6 hours PRN  LORazepam     Tablet 0.5 milliGRAM(s) Oral at bedtime PRN  melatonin 3 milliGRAM(s) Oral at bedtime PRN      LABS:                        10.9   14.70 )-----------( 266      ( 27 Oct 2022 06:14 )             33.1     Hgb Trend: 10.9<--, 11.7<--  10-25    133<L>  |  97<L>  |  32<H>  ----------------------------<  98  4.3   |  24  |  1.63<H>    Ca    10.2      25 Oct 2022 23:56  Phos  3.4     10-26  Mg     2.00     10-26    TPro  8.2  /  Alb  4.1  /  TBili  <0.2  /  DBili  x   /  AST  22  /  ALT  12  /  AlkPhos  59  10-25    Creatinine Trend: 1.63<--  PT/INR - ( 26 Oct 2022 07:14 )   PT: 12.0 sec;   INR: 1.03 ratio         PTT - ( 26 Oct 2022 07:14 )  PTT:28.9 sec          MICROBIOLOGY:       RADIOLOGY:  [ ] Reviewed and interpreted by me    PULMONARY FUNCTION TESTS:    EKG:

## 2022-10-27 NOTE — CONSULT NOTE ADULT - ASSESSMENT
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  A biopsy of a scalp lesion several weeks ago revealed metastatic adenocarcinoma.  Further work up with MRI PET was concerning for primary lung cancer.  Pulmonary was consulted for further evaluation.      #Adenocarcinoma of unclear origin  - per outpatient records patient underwent scalp biopsy 8/31/22  which demonstrated metastatic adenocarcinoma with mucinous differentiation, with phenotypic studies supportive of upper GI origin  - CT head 10/26/22 demonstrating large area of osseous destruction involving calvarium  - CT chest 10/19/22 demonstrating RUL spiculated nodule measuring 1.8 cm, no prior imaging for comparison     Recommendations  - pulmonary to discuss w/interventional pulmonology bronchoscopy w/biopsy  - provide and encourage incentive spirometry while inpatient  - palliative consult  - PT/OT    Note incomplete    To be discussed w/Dr. 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  A biopsy of a scalp lesion several weeks ago revealed metastatic adenocarcinoma.  Further work up with MRI PET was concerning for primary lung cancer.  Pulmonary was consulted for further evaluation.      #Adenocarcinoma of unclear origin  - per outpatient records patient underwent scalp biopsy 8/31/22  which demonstrated metastatic adenocarcinoma with mucinous differentiation, with phenotypic studies supportive of upper GI origin  - CT head 10/26/22 demonstrating large area of osseous destruction involving calvarium  - CT chest 10/19/22 demonstrating RUL spiculated nodule measuring 1.8 cm, no prior imaging for comparison     Recommendations  - will plan for bronchoscopy with biopsy next tuesday (11/1/2022)  - provide and encourage incentive spirometry while inpatient  - palliative consult  - PT/OT    Patient seen and discussed w/Dr. Ricardo Barrientos PGY5  [id number]

## 2022-10-27 NOTE — CONSULT NOTE ADULT - SUBJECTIVE AND OBJECTIVE BOX
Neurology  Consult Note  10-27-22    Name:  JUAN ALBERTO CERVANTES; 82y (1940)    Reason for Admission: Nonspecific abnormal findings on radiological and other examination of lung field    HPI: 81yo R-handed woman with PMH of psoriasis and recent diagnosis of metastatic lung cancer to the bone and skull admitted for further malignancy workup. Patient reports that she began noticing a "bump" in her scalp 2022. She went to see a dermatologist, was suspected to have a cyst, and underwent incision for drainage 2022. 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 Patient saw Dr. Cantu and underwent a PET scan which showed lung cancer prompting further workup. Patient reports pain with palpation at the site of incision, and has occasional mild dull R frontal headaches. She otherwise reports to feel well. Reports a chronic morning cough and admits to being an active smoker for the past 70+ years. She denies weakness, numbness, acute changes in vision, difficulty with speech or swallow, recent fever or illness. Patient continues to perform ADLs independently.      Review of Systems:  As states in HPI.        PMHx:   Heart murmur    Chronic UTI      PFHx:   Family history of breast cancer (Sibling),  as of 11 years ago      PSuHx:   History of arthroscopy of shoulder        Medications:  MEDICATIONS  (STANDING):  ALPRAZolam 0.25 milliGRAM(s) Oral Once  influenza  Vaccine (HIGH DOSE) 0.7 milliLiter(s) IntraMuscular once    MEDICATIONS  (PRN):  acetaminophen     Tablet .. 650 milliGRAM(s) Oral every 6 hours PRN Mild Pain (1 - 3)  LORazepam     Tablet 0.5 milliGRAM(s) Oral at bedtime PRN Anxiety  melatonin 3 milliGRAM(s) Oral at bedtime PRN Insomnia      Vitals:  T(C): 36.9 (10-27-22 @ 05:30), Max: 36.9 (10-26-22 @ 16:39)  HR: 66 (10-27-22 @ 05:30) (66 - 77)  BP: 120/62 (10-27-22 @ 05:30) (120/62 - 191/76)  RR: 16 (10-27-22 @ 05:30) (16 - 18)  SpO2: 99% (10-27-22 @ 05:30) (99% - 100%)    Physical Examination:  Neurologic:  - Mental Status: Alert, awake, oriented to person, place, and time; Speech is fluent with intact comprehension; Good overall fund of knowledge; Normal mood and affect. Follows all commands.  - Cranial Nerves:  II: Visual fields are full to confrontation; Pupils are equal, round, and reactive to light;  III, IV, VI: Extraocular movements are intact without nystagmus.  V: Facial sensation is intact in the V1-V3 distribution bilaterally.  VII: Face is symmetric with normal eye closure and smile.  VIII: Hearing is intact to finger rub.  IX, X: Uvula is midline and soft palate rises symmetrically.  XI: Head turning and shoulder shrug are intact.  XII: Tongue protrudes in the midline.  - Motor: Strength is 5/5 throughout. There is no pronator drift. Normal muscle bulk and tone. No tremor or myoclonus noted.  - Reflexes: 2+ and symmetric at the biceps, brachioradialis, and knees. Plantar responses equivocal due to withdrawal bilaterally.  - Sensory: Intact throughout to light touch. No extinction on double stimulation.  - Coordination: Finger-nose-finger intact without dysmetria. Rapid alternating hand movements intact. Finger tapping intact b/l.  - Gait: Normal steps, base, arm swing, and turning. Romberg testing is negative.    Labs:                        10.9   14.70 )-----------( 266      ( 27 Oct 2022 06:14 )             33.1     10    136  |  100  |  25<H>  ----------------------------<  99  4.0   |  24  |  1.29    Ca    9.8      27 Oct 2022 06:14  Phos  3.8     10-27  Mg     1.90     10-27    TPro  8.2  /  Alb  4.1  /  TBili  <0.2  /  DBili  x   /  AST  22  /  ALT  12  /  AlkPhos  59  10-25    CAPILLARY BLOOD GLUCOSE        LIVER FUNCTIONS - ( 25 Oct 2022 23:56 )  Alb: 4.1 g/dL / Pro: 8.2 g/dL / ALK PHOS: 59 U/L / ALT: 12 U/L / AST: 22 U/L / GGT: x             PT/INR - ( 26 Oct 2022 07:14 )   PT: 12.0 sec;   INR: 1.03 ratio    PTT - ( 26 Oct 2022 07:14 )  PTT:28.9 sec        Radiology:  CT Chest w/ IV Cont (10.19.22 @ 13:49)  IMPRESSION:    1.  Spiculated 1.8 cm nodule in the right upper lobe withmediastinal and   right hilar lymphadenopathy suggesting primary lung malignancy with   metastatic disease. Indeterminant subcentimeter solid nodules in the left   upper lobe.  2.  Hydrometra. Concern for cervical stenosis or mass. Recommend pelvic   ultrasound for further evaluation.    NM PET/CT Onc FDG Skull to Thigh, Inital (10.13.22 @ 15:25)  IMPRESSION: Abnormal FDG-PET/CT scan.    1. Large FDG-avid destructive lesion in right parietal calvarium with an  associated soft tissue mass which extends to the scalp and   intracranially. Subadjacent to the scalp component of the lesion there is   an indeterminate semilunar area of photopenia. The lesion extends across   the midline, into the left posteriorparietal calvarium. There appears to   be minimal mass effect on the brain. An MRI is recommended for further   evaluation.    2. FDG-avid spiculated right upper lobe pulmonary nodule and FDG-avid   mediastinal and right perihilar lymph nodes are suspicious for primary   lung carcinoma with lymph node metastases. Minimally FDG-avid right upper   lobe groundglass opacity is nonspecific. Contrast-enhanced CT of chest is   recommended for further evaluation.    3. Predominantly photopenic pelvic mass with mild, peripheral FDG avidity   is indeterminate. Contrast-enhanced CT or MRI is recommended for further   evaluation.    4. Nonspecific, asymmetric, increased FDG activity involving the left   external ear. Please correlate with direct visualization.    5. Right extrarenal pelvis containing a 0.8 cm nonobstructing calculus.

## 2022-10-27 NOTE — CONSULT NOTE ADULT - ATTENDING COMMENTS
Ms. Milian is an 81 yo woman with lung cancer with large metastatic lesion in the calvarium.   Further work up and management per neuro-oncology (as outpatient) medical oncology and radiation oncology.   No indication for any antiseizure medication at this time.   Thank you  Neurology signing off. Please reconsult PRN or call [facility] with any questions.

## 2022-10-27 NOTE — CONSULT NOTE ADULT - ASSESSMENT
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 Patient  noticed a "bump" in her scalp 04/2022, underwent bx showing mucinous adenocarcinoma, with PET scan showing lung cancer. Physical exam with no focal neurological deficit. Labs remarkable for leukocytosis.    Impression: R scalp mass due to metastatic adenocarcinoma in the setting of recently found lung cancer.    Recommendations:  [] Management per Radiation and Medical oncology, planned for RT  [] NSGY consult and recommendations appreciated, patient declines surgery  [] Neuro-oncology consulted and pending further recommendations regarding the start of Keppra 500mg q12h. Patient may follow-up with Dr. Min within a week of discharge for further management and care at [address] [place] [address] ([phone])  [] Neuro checks per unit protocol  [] Rest of care per primary team    Case discussed with neuro-oncologist Dr. Min.  Case seen and discussed with neurology attending Dr. Rosales. 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 Patient  noticed a "bump" in her scalp 04/2022, underwent bx showing mucinous adenocarcinoma, with PET scan showing lung cancer. Physical exam with no focal neurological deficit. Labs remarkable for leukocytosis.    Impression: R scalp mass due to metastatic adenocarcinoma in the setting of recently found lung cancer.    Recommendations:  [] Management per Radiation and Medical oncology, planned for RT  [] NSGY consult and recommendations appreciated, patient declines surgery  [] Neuro-oncology - the patient may follow-up with Dr. Min within a week of discharge for further management and care at [address] [place] [address] ([phone])  [] Neuro checks per unit protocol  [] Rest of care per primary team    Case discussed with neuro-oncologist Dr. Min.  Case seen and discussed with neurology attending Dr. Rosales.

## 2022-10-27 NOTE — PHYSICAL THERAPY INITIAL EVALUATION ADULT - PERTINENT HX OF CURRENT PROBLEM, REHAB EVAL
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

## 2022-10-28 NOTE — PROGRESS NOTE ADULT - ASSESSMENT
83 yo fit elderly with dx of mucinous adenoca unknown primary presenting as a scalp lesion. Her MRI of brain shows mass lesion on brain from 13 cm mass in calvarium with no brain lesions and no edema. Her PETCT  shows mass in lung and multiple adenopathy consistent with lung primary. Solitary lesion to the right parietal scalp biopsied and found to be metastatic scalp lesion from primary lung carcinoma.  Pt has been having headaches for several months, and not acute.   Today, she declined in house RT SIM and treatment.

## 2022-10-28 NOTE — PROGRESS NOTE ADULT - ASSESSMENT
83 yo fit elderly with dx of mucinous adenoca unknown primary presenting as a scalp lesion. Her MRI of brain shows mass lesion on brain from 13 cm mass in calvarium with no brain lesions and no edema. Pt has been having headaches for several months. Her PETCT  shows mass in lung and multiple adenopathy consistent with lung primary. Her initial biopsy is from outside is small specimen and pt will need more tissue.   Admitted for pulmonary consult for possible bronch and neuro-oncology evaluation as pt has dural infiltration.     Solitary lesion to the right parietal scalp biopsied and found to be metastatic scalp lesion from primary lung carcinoma  - PET Scan 10/13 Large FDG-avid destructive lesion in right parietal calvarium with an associated soft tissue mass which extends to the scalp and intracranially.  - Underlying mets to the calvarium  - MRI 10/20 showing Marked expansion and abnormal signal within the entire right posterior parietal and occipital calvarium crossing the midline to include the left occipital calvarium, measuring at least 13 cm in length by 2 cm in width (largest dimension), compatible with known large osseous metastasis. Abnormal signal along the entire dura underlying the lesion, most suggestive of dural infiltration. No invasion of the brain parenchyma. Moderate mass effect upon the right posterior parietal lobe, right occipital lobe, and right posterior temporal lobe secondary to the bony metastasis. There is a dominant overlying soft tissue component measuring approximately 3.6 x 1.4 cm. Several additional scattered subcentimeter foci of enhancement within the rest of the calvarium, suggestive of additional very small osseous metastases.      -Interventional pulm consulted for biopsy of the lung mass, plan for EBUS/bron on Tuesday 11/2. Patient amendable  -Will need next generation sequencing and PDL1 testing on the biopsy specimen to guide systemic treatment options  -Patient would not need to stay admitted for biopsy results  -Neuro surg input appreciated, pt and family deferring any surgical intervention  -Neuro-oncology consulted, appreciate recs, now on Keppra  -Radiation oncology evaluation, will  plan for outpatient evaluation  -agree with Pall Care consult for further pain management and GOC  -Monitor Cr  -No plans for inpatient chemotherapy  -Rest of care as per primary team  -Patient to followup with Dr. Cantu ([facility]) upon discharge  -C/w Supportive care, pain control, Nutrition, PT, DVT ppx  -Oncology will continue to follow with you      Case discussed with Dr. Sherri MUÑIZ  Oncology Physician Assistant  Judy [facility]LAS/[facility]  Pager [phone] also available on Teams    If before 8am/after 5pm or on weekends please page On-call Oncology Fellow   83 yo fit elderly with dx of mucinous adenoca unknown primary presenting as a scalp lesion. Her MRI of brain shows mass lesion on brain from 13 cm mass in calvarium with no brain lesions and no edema. Pt has been having headaches for several months. Her PETCT  shows mass in lung and multiple adenopathy consistent with lung primary. Her initial biopsy is from outside is small specimen and pt will need more tissue.   Admitted for pulmonary consult for possible bronch and neuro-oncology evaluation as pt has dural infiltration.     Solitary lesion to the right parietal scalp biopsied and found to be metastatic scalp lesion from primary lung carcinoma  - PET Scan 10/13 Large FDG-avid destructive lesion in right parietal calvarium with an associated soft tissue mass which extends to the scalp and intracranially.  - Underlying mets to the calvarium  - MRI 10/20 showing Marked expansion and abnormal signal within the entire right posterior parietal and occipital calvarium crossing the midline to include the left occipital calvarium, measuring at least 13 cm in length by 2 cm in width (largest dimension), compatible with known large osseous metastasis. Abnormal signal along the entire dura underlying the lesion, most suggestive of dural infiltration. No invasion of the brain parenchyma. Moderate mass effect upon the right posterior parietal lobe, right occipital lobe, and right posterior temporal lobe secondary to the bony metastasis. There is a dominant overlying soft tissue component measuring approximately 3.6 x 1.4 cm. Several additional scattered subcentimeter foci of enhancement within the rest of the calvarium, suggestive of additional very small osseous metastases.      -Interventional pulm consulted for biopsy of the lung mass, plan for EBUS/bron on Tuesday 11/2. 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  -agree with Pall Care consult for further pain management and GOC  -Monitor Cr  -No plans for inpatient chemotherapy  -Rest of care as per primary team  -Patient to followup with Dr. Cantu ([facility]) upon discharge  -C/w Supportive care, pain control, Nutrition, PT, DVT ppx  -Oncology will continue to follow with you      Case discussed with Dr. Sherri MUÑIZ  Oncology Physician Assistant  [facility]  Pager [phone] also available on Teams    If before 8am/after 5pm or on weekends please page On-call Oncology Fellow

## 2022-10-28 NOTE — PROGRESS NOTE ADULT - PROBLEM SELECTOR PLAN 4
- Predominantly photopenic pelvic mass with mild, peripheral FDG avidity is indeterminate. Contrast-enhanced CT or MRI is recommended for further evaluation.  - Pelvic US on 10/26 suboptimal study   - F/U Pelvic MRI

## 2022-10-28 NOTE — PROGRESS NOTE ADULT - SUBJECTIVE AND OBJECTIVE BOX
**************************************  Sarah Polk, PGY-1  After 7PM, please contact night float at #[phone] or [phone]  **************************************    INTERVAL HPI/OVERNIGHT EVENTS:  Patient was seen and examined at bedside. As per nurse and patient, no o/n events, patient resting comfortably. No complaints at this time. Patient denies: fever, chills, dizziness, weakness, HA, Changes in vision, CP, palpitations, SOB, cough, N/V/D/C, dysuria, changes in bowel movements, LE edema. ROS otherwise negative.    VITAL SIGNS:  T(F): 98 (10-27-22 @ 22:20)  HR: 83 (10-27-22 @ 22:20)  BP: 153/69 (10-27-22 @ 22:20)  RR: 16 (10-27-22 @ 22:20)  SpO2: 100% (10-27-22 @ 22:20)  Wt(kg): --    PHYSICAL EXAM:    PHYSICAL EXAM:    GENERAL: NAD, lying in bed comfortably  HEAD:  Atraumatic, normocephalic  EYES: EOMI, PERRLA, conjunctiva and sclera clear  ENT: Moist mucous membranes  NECK: Supple, no JVD  HEART: Regular rate and rhythm, no murmurs, rubs, or gallops  LUNGS: Unlabored respirations.  Clear to auscultation bilaterally, no crackles, wheezing, or rhonchi  ABDOMEN: Soft, nontender, nondistended, +BS  EXTREMITIES: 2+ peripheral pulses bilaterally. No clubbing, cyanosis, or edema  NERVOUS SYSTEM:  A&Ox3, no focal deficits   SKIN: 2x2cm indurated hard lesion on the right parietal scalp, with crusting and flaking. No discharge, no oozing      MEDICATIONS  (STANDING):  chlorhexidine 2% Cloths 1 Application(s) Topical once  heparin   Injectable 5000 Unit(s) SubCutaneous every 8 hours  influenza  Vaccine (HIGH DOSE) 0.7 milliLiter(s) IntraMuscular once  levETIRAcetam 500 milliGRAM(s) Oral two times a day    MEDICATIONS  (PRN):  acetaminophen     Tablet .. 650 milliGRAM(s) Oral every 6 hours PRN Mild Pain (1 - 3)  ALPRAZolam 0.5 milliGRAM(s) Oral every 8 hours PRN anxiety  melatonin 3 milliGRAM(s) Oral at bedtime PRN Insomnia  morphine  - Injectable 2 milliGRAM(s) IV Push every 24 hours PRN prior to RT      Allergies    No Known Allergies    Intolerances        LABS:                        10.9   14.70 )-----------( 266      ( 27 Oct 2022 06:14 )             33.1     10-27    136  |  100  |  25<H>  ----------------------------<  99  4.0   |  24  |  1.29    Ca    9.8      27 Oct 2022 06:14  Phos  3.8     10-27  Mg     1.90     10-27      PT/INR - ( 26 Oct 2022 07:14 )   PT: 12.0 sec;   INR: 1.03 ratio         PTT - ( 26 Oct 2022 07:14 )  PTT:28.9 sec      RADIOLOGY & ADDITIONAL TESTS:  Reviewed

## 2022-10-28 NOTE — PROGRESS NOTE ADULT - SUBJECTIVE AND OBJECTIVE BOX
INTERVAL HPI/OVERNIGHT EVENTS:  Patient seen at bedside, OOB in chair.  Accompanied by her dtr  Patient with no acute complaints  Expressing that she had a difficult time with   RT simulation yesterday, will plan for outpatient evaluation      VITAL SIGNS:  T(F): 98.4 (10-28-22 @ 12:14)  HR: 84 (10-28-22 @ 12:14)  BP: 123/77 (10-28-22 @ 12:14)  RR: 17 (10-28-22 @ 12:14)  SpO2: 96% (10-28-22 @ 12:14)  Wt(kg): --    PHYSICAL EXAM:  GENERAL: NAD, lying in bed comfortably  HEAD:  Atraumatic, normocephalic  EYES: EOMI, PERRLA, conjunctiva and sclera clear  ENT: Moist mucous membranes  NECK: Supple, no JVD  HEART: Regular rate and rhythm, no murmurs, rubs, or gallops  LUNGS: Unlabored respirations.  Clear to auscultation bilaterally, no crackles, wheezing, or rhonchi  ABDOMEN: Soft, nontender, nondistended, +BS  EXTREMITIES: 2+ peripheral pulses bilaterally. No clubbing, cyanosis, or edema  NERVOUS SYSTEM:  A&Ox3, no focal deficits   SKIN: 2x2cm indurated hard lesion on the right parietal scalp, with crusting and flaking. No discharge, no oozing      MEDICATIONS  (STANDING):  chlorhexidine 2% Cloths 1 Application(s) Topical once  heparin   Injectable 5000 Unit(s) SubCutaneous every 8 hours  influenza  Vaccine (HIGH DOSE) 0.7 milliLiter(s) IntraMuscular once  levETIRAcetam 500 milliGRAM(s) Oral two times a day  senna 2 Tablet(s) Oral at bedtime    MEDICATIONS  (PRN):  acetaminophen     Tablet .. 650 milliGRAM(s) Oral every 6 hours PRN Mild Pain (1 - 3)  ALPRAZolam 0.5 milliGRAM(s) Oral every 8 hours PRN anxiety  melatonin 3 milliGRAM(s) Oral at bedtime PRN Insomnia  morphine  - Injectable 2 milliGRAM(s) IV Push every 24 hours PRN prior to RT  oxyCODONE    IR 2.5 milliGRAM(s) Oral every 6 hours PRN Moderate Pain (4 - 6)      Allergies    No Known Allergies    Intolerances        LABS:                        10.8   11.06 )-----------( 254      ( 28 Oct 2022 05:20 )             33.1     10-28    135  |  101  |  25<H>  ----------------------------<  104<H>  3.8   |  23  |  1.37<H>    Ca    9.8      28 Oct 2022 05:20  Phos  3.8     10-28  Mg     2.00     10-28      PT/INR - ( 28 Oct 2022 05:20 )   PT: 11.6 sec;   INR: 1.00 ratio         PTT - ( 28 Oct 2022 05:20 )  PTT:28.2 sec      RADIOLOGY & ADDITIONAL TESTS:  Studies reviewed.

## 2022-10-29 NOTE — PROGRESS NOTE ADULT - PROBLEM SELECTOR PLAN 5
- Sodium 133 on admission. No history of Hyponatremia per chart review.  - Possibly secondary to poor PO intake vs SIADH in setting of lung malignancy  - Trend BMP  - Encourage PO intake - Predominantly photopenic pelvic mass with mild, peripheral FDG avidity is indeterminate. Contrast-enhanced CT or MRI is recommended for further evaluation.  - Pelvic US on 10/26 suboptimal study   - 10/27/22: Pelvic MRI hydrometria w/o cervical mass

## 2022-10-29 NOTE — PROGRESS NOTE ADULT - PROBLEM SELECTOR PLAN 7
- Diet: Regular Diet  - DVT ppx: SQH  - Dispo: Pending malignancy workup - Leucocytosis possibly reactive in setting of metastasis  - Monitor off abx  - Trend WBC and fever curve

## 2022-10-29 NOTE — PROGRESS NOTE ADULT - PROBLEM SELECTOR PLAN 3
- Per chart review baseline Cr over the last from 03/2022 ~1.30  - Possible contrast induced in setting of multiple administrations of contrast over the last couple of weeks vs pre-renal   - 10/19 CTAP Right extrarenal pelvis. Bilateral renal calculi, the largest measuring 8 mm in craniocaudal dimension in the interpolar right kidney. No hydronephrosis.  intracranially.  - Urine lytes   - Can consider renal US if persistent elevation in Cr s/p fluid bolus 500cc  - Avoid nephrotoxic meds and trend Cr Polyuria and dysuria.   - UA sent = Turbid, +nitrites, WBC > 720 RBC 5, Bacteria many, Large LE   - Urine culture ordered   - Ceftriaxone 1 g (10/29 - 11/1)

## 2022-10-29 NOTE — PROGRESS NOTE ADULT - PROBLEM SELECTOR PLAN 4
- Predominantly photopenic pelvic mass with mild, peripheral FDG avidity is indeterminate. Contrast-enhanced CT or MRI is recommended for further evaluation.  - Pelvic US on 10/26 suboptimal study   - F/U Pelvic MRI Per chart review baseline Cr over the last from 03/2022 ~1.30. Contrast induced vs. pre-renal   - Creatinine improving   - Avoid nephrotoxic meds and trend Cr  - 10/19 CTAP Right extrarenal pelvis. Bilateral renal calculi, the largest measuring 8 mm in craniocaudal dimension in the interpolar right kidney. No hydronephrosis.  intracranially.

## 2022-10-29 NOTE — PROGRESS NOTE ADULT - PROBLEM SELECTOR PLAN 1
- CT Chest 10/19 with Spiculated 1.8 cm nodule in the right upper lobe with mediastinal and right hilar lymphadenopathy suggesting primary lung malignancy with metastatic disease. Indeterminant subcentimeter solid nodules in the left upper lobe.  - PET Scan from 10/13 significant for findings consistent with pulmonary lung carcinoma as indicated above  - Malignancy markers CEA, , CA-125 all elevated 09/2022  - Plan for Lung biopsy while admitted  - Oncology consulted  - interventional pulmonology consulted - CT Chest 10/19 with Spiculated 1.8 cm nodule in the right upper lobe with mediastinal and right hilar lymphadenopathy suggesting primary lung malignancy with metastatic disease. Indeterminant subcentimeter solid nodules in the left upper lobe.  - PET Scan from 10/13 significant for findings consistent with pulmonary lung carcinoma as indicated above  - Malignancy markers CEA, , CA-125 all elevated 09/2022  - Plan for Lung biopsy while admitted  - Oncology recs appreciated   - interventional pulmonology recs appreciated Bx  - EBUS on 11/1 planned

## 2022-10-29 NOTE — PROGRESS NOTE ADULT - PROBLEM SELECTOR PLAN 6
- Leucocytosis possibly reactive in setting of metastasis  - Monitor off abx  - Trend WBC and fever curve - Sodium 133 on admission. No history of Hyponatremia per chart review.  - Possibly secondary to poor PO intake vs SIADH in setting of lung malignancy  - Trend BMP  - Encourage PO intake

## 2022-10-29 NOTE — PROGRESS NOTE ADULT - ASSESSMENT
82y F with RUL lung mass, right posterior parietal occipital calvarial lesion crossing midline to left with moderate mass effect upon right parietal and occipital lobe, no invasion of brain parenchyma. 82y F with RUL lung mass, right posterior parietal occipital calvarial lesion crossing midline to left with moderate mass effect upon right parietal and occipital lobe, no invasion of brain parenchyma. Course complicated by a UTI.

## 2022-10-29 NOTE — PROGRESS NOTE ADULT - SUBJECTIVE AND OBJECTIVE BOX
Kiran Rodriguez, PGY-2  Internal Medicine    PROGRESS NOTE:    ID #:     Patient is a 82y old  Female who presents with a chief complaint of Metastatic lung cancer (28 Oct 2022 19:53)      MAJOR INTERVAL HOSPITAL EVENTS:     SUBJECTIVE / OVERNIGHT EVENTS: No acute overnight events.     REVIEW OF SYSTEMS:  CONSTITUTIONAL: No weakness, fevers or chills  EYES/ENT: No visual changes;  No vertigo or throat pain   NECK: No pain or stiffness  RESPIRATORY: No cough, wheezing, hemoptysis; No shortness of breath  CARDIOVASCULAR: No chest pain or palpitations  GASTROINTESTINAL: No abdominal or epigastric pain. No nausea, vomiting, or hematemesis; No diarrhea or constipation. No melena or hematochezia.  GENITOURINARY: No dysuria, frequency or hematuria  NEUROLOGICAL: No numbness or weakness  SKIN: No itching, burning, rashes, or lesions   All other review of systems is negative unless indicated above.    MEDICATIONS  (STANDING):  chlorhexidine 2% Cloths 1 Application(s) Topical once  heparin   Injectable 5000 Unit(s) SubCutaneous every 8 hours  influenza  Vaccine (HIGH DOSE) 0.7 milliLiter(s) IntraMuscular once  levETIRAcetam 500 milliGRAM(s) Oral two times a day  mupirocin 2% Ointment 1 Application(s) Topical two times a day  senna 2 Tablet(s) Oral at bedtime    MEDICATIONS  (PRN):  acetaminophen     Tablet .. 650 milliGRAM(s) Oral every 6 hours PRN Mild Pain (1 - 3)  ALPRAZolam 0.5 milliGRAM(s) Oral every 8 hours PRN anxiety  melatonin 3 milliGRAM(s) Oral at bedtime PRN Insomnia  morphine  - Injectable 2 milliGRAM(s) IV Push every 24 hours PRN prior to RT  oxyCODONE    IR 2.5 milliGRAM(s) Oral every 6 hours PRN Moderate Pain (4 - 6)      CAPILLARY BLOOD GLUCOSE        I&O's Summary      PHYSICAL EXAM:  Vital Signs Last 24 Hrs  T(C): 37.1 (29 Oct 2022 05:25), Max: 37.4 (28 Oct 2022 20:32)  T(F): 98.8 (29 Oct 2022 05:25), Max: 99.4 (28 Oct 2022 20:32)  HR: 73 (29 Oct 2022 05:25) (73 - 84)  BP: 132/61 (29 Oct 2022 05:25) (122/65 - 132/61)  BP(mean): --  RR: 17 (29 Oct 2022 05:25) (17 - 18)  SpO2: 100% (29 Oct 2022 05:25) (95% - 100%)    Parameters below as of 29 Oct 2022 05:25  Patient On (Oxygen Delivery Method): room air        GENERAL: No acute distress, well-developed  HEAD:  Atraumatic, Normocephalic  EYES: EOMI, PERRLA, conjunctiva and sclera clear  NECK: Supple, no lymphadenopathy, no JVD  CHEST/LUNG: CTAB; No wheezes, rales, or rhonchi  HEART: Regular rate and rhythm; Normal s1 and s2, No murmurs, rubs, or gallops  ABDOMEN: Soft, non-tender, non-distended; normal bowel sounds, no organomegaly  EXTREMITIES:  2+ peripheral pulses b/l, No clubbing, cyanosis, or edema  NEUROLOGY: A&O x 3, no focal deficits  SKIN: No rashes or lesions          LABS:                        10.9   10.61 )-----------( 244      ( 29 Oct 2022 05:47 )             34.0     10-29    135  |  102  |  22  ----------------------------<  98  4.0   |  23  |  1.34<H>    Ca    9.9      29 Oct 2022 05:47  Phos  3.6     10-29  Mg     2.30     10-29      PT/INR - ( 28 Oct 2022 05:20 )   PT: 11.6 sec;   INR: 1.00 ratio         PTT - ( 28 Oct 2022 05:20 )  PTT:28.2 sec            RADIOLOGY & ADDITIONAL TESTS:  Results Reviewed:   Imaging Personally Reviewed:  Electrocardiogram Personally Reviewed:    COORDINATION OF CARE:  Care Discussed with Consultants/Other Providers [Y/N]:  Prior or Outpatient Records Reviewed [Y/N]:   Kiran Rodriguez, PGY-3  Internal Medicine    PROGRESS NOTE:    ID #:     Patient is a 82y old  Female who presents with a chief complaint of Metastatic lung cancer (28 Oct 2022 19:53)      MAJOR INTERVAL HOSPITAL EVENTS:   - No acute interval events     SUBJECTIVE / OVERNIGHT EVENTS: No acute overnight events.   - Polyuria and dysuria per patient for 1 day  - No fever or chills   - No N/V/D or chest pain or palpitations     REVIEW OF SYSTEMS:  CONSTITUTIONAL: No weakness, fevers or chills  EYES/ENT: No visual changes;  No vertigo or throat pain   NECK: No pain or stiffness  RESPIRATORY: No cough, wheezing, hemoptysis; No shortness of breath  CARDIOVASCULAR: No chest pain or palpitations  GASTROINTESTINAL: No abdominal or epigastric pain. No nausea, vomiting, or hematemesis; No diarrhea or constipation. No melena or hematochezia.  GENITOURINARY: + dysuria, +  frequency or - hematuria    MEDICATIONS  (STANDING):  chlorhexidine 2% Cloths 1 Application(s) Topical once  heparin   Injectable 5000 Unit(s) SubCutaneous every 8 hours  influenza  Vaccine (HIGH DOSE) 0.7 milliLiter(s) IntraMuscular once  levETIRAcetam 500 milliGRAM(s) Oral two times a day  mupirocin 2% Ointment 1 Application(s) Topical two times a day  senna 2 Tablet(s) Oral at bedtime    MEDICATIONS  (PRN):  acetaminophen     Tablet .. 650 milliGRAM(s) Oral every 6 hours PRN Mild Pain (1 - 3)  ALPRAZolam 0.5 milliGRAM(s) Oral every 8 hours PRN anxiety  melatonin 3 milliGRAM(s) Oral at bedtime PRN Insomnia  morphine  - Injectable 2 milliGRAM(s) IV Push every 24 hours PRN prior to RT  oxyCODONE    IR 2.5 milliGRAM(s) Oral every 6 hours PRN Moderate Pain (4 - 6)      CAPILLARY BLOOD GLUCOSE        I&O's Summary      PHYSICAL EXAM:  Vital Signs Last 24 Hrs  T(C): 37.1 (29 Oct 2022 05:25), Max: 37.4 (28 Oct 2022 20:32)  T(F): 98.8 (29 Oct 2022 05:25), Max: 99.4 (28 Oct 2022 20:32)  HR: 73 (29 Oct 2022 05:25) (73 - 84)  BP: 132/61 (29 Oct 2022 05:25) (122/65 - 132/61)  BP(mean): --  RR: 17 (29 Oct 2022 05:25) (17 - 18)  SpO2: 100% (29 Oct 2022 05:25) (95% - 100%)    Parameters below as of 29 Oct 2022 05:25  Patient On (Oxygen Delivery Method): room air        GENERAL: No acute distress  HEAD:  Atraumatic, Normocephalic  EYES: EOMI, PERRLA, conjunctiva and sclera clear  NECK: Supple,, no JVD  CHEST/LUNG: CTAB; No wheezes, rales, or rhonchi  HEART: Regular rate and rhythm; Normal s1 and s2, No murmurs, rubs, or gallops  ABDOMEN: Soft, non-tender, non-distended; normal bowel sounds, No suprapubic tenderness   EXTREMITIES:  2+ peripheral pulses b/l, No clubbing, cyanosis, or edema  NEUROLOGY: A&O x 3, no focal deficits  SKIN: No rashes or lesions          LABS:                        10.9   10.61 )-----------( 244      ( 29 Oct 2022 05:47 )             34.0     10-29    135  |  102  |  22  ----------------------------<  98  4.0   |  23  |  1.34<H>    Ca    9.9      29 Oct 2022 05:47  Phos  3.6     10-29  Mg     2.30     10-29      PT/INR - ( 28 Oct 2022 05:20 )   PT: 11.6 sec;   INR: 1.00 ratio         PTT - ( 28 Oct 2022 05:20 )  PTT:28.2 sec            RADIOLOGY & ADDITIONAL TESTS:  Results Reviewed:   Imaging Personally Reviewed:  Electrocardiogram Personally Reviewed:    COORDINATION OF CARE:  Care Discussed with Consultants/Other Providers [Y/N]:  Prior or Outpatient Records Reviewed [Y/N]:

## 2022-10-29 NOTE — PROGRESS NOTE ADULT - PROBLEM SELECTOR PLAN 2
- Solitary lesion to the right parietal scalp biopsied and found to be metastatic scalp lesion from primary lung carcinoma  - PET Scan 10/13 Large FDG-avid destructive lesion in right parietal calvarium with an associated soft tissue mass which extends to the scalp and intracranially.  - Underlying mets to the calvarium  - MRI 10/20 showing Marked expansion and abnormal signal within the entire right posterior parietal and occipital calvarium crossing the midline to include the left occipital calvarium, measuring at least 13 cm in length by 2 cm in width (largest dimension), compatible with known large osseous metastasis. Abnormal signal along the entire dura underlying the lesion, most suggestive of dural infiltration. No invasion of the brain parenchyma. Moderate mass effect upon the right posterior parietal lobe, right occipital lobe, and right posterior temporal lobe secondary to the bony metastasis. There is a dominant overlying soft tissue component measuring approximately 3.6 x 1.4 cm. Several additional scattered subcentimeter foci of enhancement within the rest of the calvarium, suggestive of additional very small osseous metastases.  - Neurosurgery on board. Appreciate recs  - F/U Stereotactic head CT - Solitary lesion to the right parietal scalp biopsied and found to be metastatic scalp lesion from primary lung carcinoma  - PET Scan 10/13 Large FDG-avid destructive lesion in right parietal calvarium with an associated soft tissue mass which extends to the scalp and intracranially.  - Underlying mets to the calvarium  - MRI 10/20 showing Marked expansion and abnormal signal within the entire right posterior parietal and occipital calvarium crossing the midline to include the left occipital calvarium, measuring at least 13 cm in length by 2 cm in width (largest dimension), compatible with known large osseous metastasis. Abnormal signal along the entire dura underlying the lesion, most suggestive of dural infiltration. No invasion of the brain parenchyma. Moderate mass effect upon the right posterior parietal lobe, right occipital lobe, and right posterior temporal lobe secondary to the bony metastasis. There is a dominant overlying soft tissue component measuring approximately 3.6 x 1.4 cm. Several additional scattered subcentimeter foci of enhancement within the rest of the calvarium, suggestive of additional very small osseous metastases.  - Neurosurgery on board. Appreciate recs  - Stereotactic head CT large area of osseous destruction involving the calvarium intracranial or extracalvarial

## 2022-10-30 NOTE — PROGRESS NOTE ADULT - PROBLEM SELECTOR PLAN 1
- CT Chest 10/19 with Spiculated 1.8 cm nodule in the right upper lobe with mediastinal and right hilar lymphadenopathy suggesting primary lung malignancy with metastatic disease. Indeterminant subcentimeter solid nodules in the left upper lobe.  - PET Scan from 10/13 significant for findings consistent with pulmonary lung carcinoma as indicated above  - Malignancy markers CEA, , CA-125 all elevated 09/2022  - Plan for Lung biopsy while admitted  - Oncology recs appreciated   - interventional pulmonology recs appreciated Bx  - EBUS on 11/1 planned

## 2022-10-30 NOTE — PROGRESS NOTE ADULT - PROBLEM SELECTOR PLAN 2
- Solitary lesion to the right parietal scalp biopsied and found to be metastatic scalp lesion from primary lung carcinoma  - PET Scan 10/13 Large FDG-avid destructive lesion in right parietal calvarium with an associated soft tissue mass which extends to the scalp and intracranially.  - Underlying mets to the calvarium  - MRI 10/20 showing Marked expansion and abnormal signal within the entire right posterior parietal and occipital calvarium crossing the midline to include the left occipital calvarium, measuring at least 13 cm in length by 2 cm in width (largest dimension), compatible with known large osseous metastasis. Abnormal signal along the entire dura underlying the lesion, most suggestive of dural infiltration. No invasion of the brain parenchyma. Moderate mass effect upon the right posterior parietal lobe, right occipital lobe, and right posterior temporal lobe secondary to the bony metastasis. There is a dominant overlying soft tissue component measuring approximately 3.6 x 1.4 cm. Several additional scattered subcentimeter foci of enhancement within the rest of the calvarium, suggestive of additional very small osseous metastases.  - Neurosurgery on board. Appreciate recs  - Stereotactic head CT large area of osseous destruction involving the calvarium intracranial or extracalvarial - Solitary lesion to the right parietal scalp biopsied and found to be metastatic scalp lesion from primary lung carcinoma  - PET Scan 10/13 Large FDG-avid destructive lesion in right parietal calvarium with an associated soft tissue mass which extends to the scalp and intracranially.  - Underlying mets to the calvarium  - MRI 10/20 showing Marked expansion and abnormal signal within the entire right posterior parietal and occipital calvarium crossing the midline to include the left occipital calvarium, measuring at least 13 cm in length by 2 cm in width (largest dimension), compatible with known large osseous metastasis. Abnormal signal along the entire dura underlying the lesion, most suggestive of dural infiltration. No invasion of the brain parenchyma. Moderate mass effect upon the right posterior parietal lobe, right occipital lobe, and right posterior temporal lobe secondary to the bony metastasis. There is a dominant overlying soft tissue component measuring approximately 3.6 x 1.4 cm. Several additional scattered subcentimeter foci of enhancement within the rest of the calvarium, suggestive of additional very small osseous metastases.  - Neurosurgery on board. They recommend no interventions at this time  - Rad Onc consulted, pt to f/u outpatient, does not wish to pursue radiation inpatient at this time.   - Stereotactic head CT large area of osseous destruction involving the calvarium intracranial or extracalvarial

## 2022-10-30 NOTE — PROGRESS NOTE ADULT - PROBLEM SELECTOR PLAN 4
Per chart review baseline Cr over the last from 03/2022 ~1.30. Contrast induced vs. pre-renal   - Creatinine improving   - Avoid nephrotoxic meds and trend Cr  - 10/19 CTAP Right extrarenal pelvis. Bilateral renal calculi, the largest measuring 8 mm in craniocaudal dimension in the interpolar right kidney. No hydronephrosis.  intracranially.

## 2022-10-30 NOTE — PROGRESS NOTE ADULT - PROBLEM SELECTOR PLAN 5
- Predominantly photopenic pelvic mass with mild, peripheral FDG avidity is indeterminate. Contrast-enhanced CT or MRI is recommended for further evaluation.  - Pelvic US on 10/26 suboptimal study   - 10/27/22: Pelvic MRI hydrometria w/o cervical mass

## 2022-10-30 NOTE — PROGRESS NOTE ADULT - PROBLEM SELECTOR PLAN 3
Polyuria and dysuria.   - UA sent = Turbid, +nitrites, WBC > 720 RBC 5, Bacteria many, Large LE   - f/u Ucx   - Ceftriaxone 1 g (10/29 - 11/1)

## 2022-10-30 NOTE — PROGRESS NOTE ADULT - ASSESSMENT
82y F with RUL lung mass, right posterior parietal occipital calvarial lesion crossing midline to left with moderate mass effect upon right parietal and occipital lobe, no invasion of brain parenchyma. Course complicated by a UTI.

## 2022-10-30 NOTE — PROGRESS NOTE ADULT - SUBJECTIVE AND OBJECTIVE BOX
**************************************  Sarah Polk, PGY-1  After 7PM, please contact night float at [phone] or [phone]  **************************************    INTERVAL HPI/OVERNIGHT EVENTS:  Patient was seen and examined at bedside. As per nurse and patient, no o/n events, patient resting comfortably. No complaints at this time.      VITAL SIGNS:  T(F): 99.6 (10-29-22 @ 20:57)  HR: 68 (10-29-22 @ 22:30)  BP: 133/61 (10-29-22 @ 22:30)  RR: 18 (10-29-22 @ 20:57)  SpO2: 97% (10-29-22 @ 20:57)  Wt(kg): --    PHYSICAL EXAM:      GENERAL: NAD, lying in bed comfortably  HEAD:  Atraumatic, normocephalic  EYES: EOMI, PERRLA, conjunctiva and sclera clear  ENT: Moist mucous membranes  NECK: Supple, no JVD  HEART: Regular rate and rhythm, no murmurs, rubs, or gallops  LUNGS: Unlabored respirations.  Clear to auscultation bilaterally, no crackles, wheezing, or rhonchi  ABDOMEN: Soft, nontender, nondistended, +BS  EXTREMITIES: 2+ peripheral pulses bilaterally. No clubbing, cyanosis, or edema  NERVOUS SYSTEM:  A&Ox3, no focal deficits   SKIN: 2x2cm indurated hard lesion on the right parietal scalp, with crusting and flaking. No discharge, no oozing      MEDICATIONS  (STANDING):  cefTRIAXone   IVPB 1000 milliGRAM(s) IV Intermittent every 24 hours  chlorhexidine 2% Cloths 1 Application(s) Topical once  heparin   Injectable 5000 Unit(s) SubCutaneous every 8 hours  influenza  Vaccine (HIGH DOSE) 0.7 milliLiter(s) IntraMuscular once  levETIRAcetam 500 milliGRAM(s) Oral two times a day  mupirocin 2% Ointment 1 Application(s) Topical two times a day  senna 2 Tablet(s) Oral at bedtime    MEDICATIONS  (PRN):  acetaminophen     Tablet .. 650 milliGRAM(s) Oral every 6 hours PRN Mild Pain (1 - 3)  ALPRAZolam 0.5 milliGRAM(s) Oral every 8 hours PRN anxiety  melatonin 3 milliGRAM(s) Oral at bedtime PRN Insomnia  morphine  - Injectable 2 milliGRAM(s) IV Push every 24 hours PRN prior to RT  oxyCODONE    IR 2.5 milliGRAM(s) Oral every 6 hours PRN Moderate Pain (4 - 6)      Allergies    No Known Allergies    Intolerances        LABS:                        10.9   10.61 )-----------( 244      ( 29 Oct 2022 05:47 )             34.0     10-29    135  |  102  |  22  ----------------------------<  98  4.0   |  23  |  1.34<H>    Ca    9.9      29 Oct 2022 05:47  Phos  3.6     10-29  Mg     2.30     10-29        Urinalysis Basic - ( 29 Oct 2022 10:09 )    Color: Light Orange / Appearance: Turbid / S.011 / pH: x  Gluc: x / Ketone: Negative  / Bili: Negative / Urobili: <2 mg/dL   Blood: x / Protein: 100 mg/dL / Nitrite: Positive   Leuk Esterase: Large / RBC: 5 /HPF / WBC >720 /HPF   Sq Epi: x / Non Sq Epi: 1 /HPF / Bacteria: Many        RADIOLOGY & ADDITIONAL TESTS:  Reviewed

## 2022-10-31 NOTE — DISCHARGE NOTE PROVIDER - PROVIDER TOKENS
PROVIDER:[TOKEN:[[id number]],FOLLOWUP:[1 week],ESTABLISHEDPATIENT:[T]] PROVIDER:[TOKEN:[[id number]],FOLLOWUP:[1 week],ESTABLISHEDPATIENT:[T]],PROVIDER:[TOKEN:[[id number]],FOLLOWUP:[1 week],ESTABLISHEDPATIENT:[T]] PROVIDER:[TOKEN:[[id number]],FOLLOWUP:[1 week],ESTABLISHEDPATIENT:[T]],PROVIDER:[TOKEN:[[id number]],FOLLOWUP:[1 week],ESTABLISHEDPATIENT:[T]],PROVIDER:[TOKEN:[[id number]],FOLLOWUP:[1 week],ESTABLISHEDPATIENT:[T]],PROVIDER:[TOKEN:[[id number]],FOLLOWUP:[2 weeks],ESTABLISHEDPATIENT:[T]],FREE:[LAST:[link],PHONE:[(   )    -],FAX:[(   )    -]],FREE:[LAST:[Link],FIRST:[Janak],PHONE:[phone]:[(   )    -],ADDRESS:[[address] [place] [address] [place] [address]],FOLLOWUP:[1 week],ESTABLISHEDPATIENT:[T]]

## 2022-10-31 NOTE — DISCHARGE NOTE PROVIDER - NSFOLLOWUPCLINICS_GEN_ALL_ED_FT
[facility] Gynecology and Obstetrics  Gynceology/OB  [address] [place] [address]  Phone: [phone]  Fax:   Follow Up Time: 2 weeks     [facility] Gynecology and Obstetrics  Gynceology/OB  [address] [place] [address]  Phone: [phone]  Fax:   Follow Up Time: 2 weeks    [facility] Neurosurgery  Neurosurgery  Referral Assistance Program  [place]   Phone:   Fax:

## 2022-10-31 NOTE — PROGRESS NOTE ADULT - SUBJECTIVE AND OBJECTIVE BOX
**************************************  Sarah Polk, PGY-1  After 7PM, please contact night float at #[phone] or [phone]  **************************************    INTERVAL HPI/OVERNIGHT EVENTS:  Patient was seen and examined at bedside. As per nurse and patient, no o/n events, patient resting comfortably. No complaints at this time. Patient denies: fever, chills, dizziness, weakness, HA, Changes in vision, CP, palpitations, SOB, cough, N/V/D/C, dysuria, changes in bowel movements, LE edema. ROS otherwise negative.    VITAL SIGNS:  T(F): 98.1 (10-30-22 @ 21:52)  HR: 70 (10-30-22 @ 21:52)  BP: 156/62 (10-30-22 @ 21:52)  RR: 17 (10-30-22 @ 21:52)  SpO2: 100% (10-30-22 @ 21:52)  Wt(kg): --    PHYSICAL EXAM:    Constitutional: WDWN, NAD  HEENT: PERRL, EOMI, sclera non-icteric, neck supple, trachea midline, no masses, no JVD, MMM, good dentition  Respiratory: CTA b/l, good air entry b/l, no wheezing, no rhonchi, no rales, without accessory muscle use and no intercostal retractions  Cardiovascular: RRR, normal S1S2, no M/R/G  Gastrointestinal: soft, NTND, no masses palpable, BS normal  Extremities: Warm, well perfused, pulses equal bilateral upper and lower extremities, no edema, no clubbing. Capillary refill <2 sec  Neurological: AAOx3, CN Grossly intact  Skin: Normal temperature, warm, dry    MEDICATIONS  (STANDING):  cefTRIAXone   IVPB 1000 milliGRAM(s) IV Intermittent every 24 hours  chlorhexidine 2% Cloths 1 Application(s) Topical once  heparin   Injectable 5000 Unit(s) SubCutaneous every 8 hours  influenza  Vaccine (HIGH DOSE) 0.7 milliLiter(s) IntraMuscular once  levETIRAcetam 500 milliGRAM(s) Oral two times a day  mupirocin 2% Ointment 1 Application(s) Topical two times a day  polyethylene glycol 3350 17 Gram(s) Oral daily  senna 2 Tablet(s) Oral at bedtime    MEDICATIONS  (PRN):  acetaminophen     Tablet .. 650 milliGRAM(s) Oral every 6 hours PRN Mild Pain (1 - 3)  ALPRAZolam 0.5 milliGRAM(s) Oral every 8 hours PRN anxiety  melatonin 3 milliGRAM(s) Oral at bedtime PRN Insomnia  morphine  - Injectable 2 milliGRAM(s) IV Push every 24 hours PRN prior to RT  oxyCODONE    IR 2.5 milliGRAM(s) Oral every 6 hours PRN Moderate Pain (4 - 6)      Allergies    No Known Allergies    Intolerances        LABS:                        10.4   8.39  )-----------( 250      ( 30 Oct 2022 06:05 )             31.1     10-30    136  |  100  |  28<H>  ----------------------------<  96  4.1   |  24  |  1.31<H>    Ca    9.9      30 Oct 2022 06:05  Phos  4.3     10-30  Mg     2.10     10-30        Urinalysis Basic - ( 29 Oct 2022 10:09 )    Color: Light Orange / Appearance: Turbid / S.011 / pH: x  Gluc: x / Ketone: Negative  / Bili: Negative / Urobili: <2 mg/dL   Blood: x / Protein: 100 mg/dL / Nitrite: Positive   Leuk Esterase: Large / RBC: 5 /HPF / WBC >720 /HPF   Sq Epi: x / Non Sq Epi: 1 /HPF / Bacteria: Many        RADIOLOGY & ADDITIONAL TESTS:  Reviewed

## 2022-10-31 NOTE — DISCHARGE NOTE PROVIDER - NSDCMRMEDTOKEN_GEN_ALL_CORE_FT
LORazepam 0.5 mg oral tablet: 1 tab(s) orally once a day (at bedtime)   levETIRAcetam 500 mg oral tablet: 1 tab(s) orally 2 times a day  LORazepam 0.5 mg oral tablet: 1 tab(s) orally once a day, As Needed for anxiety    levETIRAcetam 500 mg oral tablet: 1 tab(s) orally 2 times a day  please continue with your evening dose after discharge and continue to take one tablet in the morning and one tablet in the evening for the following days.   LORazepam 0.5 mg oral tablet: 1 tab(s) orally once a day, As Needed for anxiety

## 2022-10-31 NOTE — PROGRESS NOTE ADULT - ASSESSMENT
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 Her MRI of brain shows mass lesion on brain from 13 cm mass in calvarium with no brain lesions and no edema. Pt has been having headaches for several months. Her PETCT  shows mass in lung and multiple adenopathy consistent with lung primary. Her initial biopsy is from outside is small specimen and pt will need more tissue.   Admitted for pulmonary consult for possible bronch and neuro-oncology evaluation as pt has dural infiltration.     Solitary lesion to the right parietal scalp biopsied and found to be metastatic scalp lesion from primary lung carcinoma  - PET Scan 10/13 Large FDG-avid destructive lesion in right parietal calvarium with an associated soft tissue mass which extends to the scalp and intracranially.  - Underlying mets to the calvarium  - MRI 10/20 showing Marked expansion and abnormal signal within the entire right posterior parietal and occipital calvarium crossing the midline to include the left occipital calvarium, measuring at least 13 cm in length by 2 cm in width (largest dimension), compatible with known large osseous metastasis. Abnormal signal along the entire dura underlying the lesion, most suggestive of dural infiltration. No invasion of the brain parenchyma. Moderate mass effect upon the right posterior parietal lobe, right occipital lobe, and right posterior temporal lobe secondary to the bony metastasis. There is a dominant overlying soft tissue component measuring approximately 3.6 x 1.4 cm. Several additional scattered subcentimeter foci of enhancement within the rest of the calvarium, suggestive of additional very small osseous metastases.      -Interventional pulm consulted for biopsy of the lung mass, plan for EBUS/bron on Tuesday 11/2. 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  -agree with Pall Care consult for further pain management and GOC  -Monitor Cr  -No plans for inpatient chemotherapy  -Rest of care as per primary team  -Patient to followup at [facility] upon discharge  -C/w Supportive care, pain control, Nutrition, PT, DVT ppx  -Oncology will continue to follow with you      Case discussed with Dr. Yen Small PA  Oncology Physician Assistant  Judy ISLSA/LEBRON [facility]  Pager [phone] also available on Teams    If before 8am/after 5pm or on weekends please page On-call Oncology Fellow   81 yo fit elderly with dx of mucinous adenoca unknown primary presenting as a scalp lesion. Her MRI of brain shows mass lesion on brain from 13 cm mass in calvarium with no brain lesions and no edema. Pt has been having headaches for several months. Her PETCT  shows mass in lung and multiple adenopathy consistent with lung primary. Her initial biopsy is from outside is small specimen and pt will need more tissue.   Admitted for pulmonary consult for possible bronch and neuro-oncology evaluation as pt has dural infiltration.     Solitary lesion to the right parietal scalp biopsied and found to be metastatic scalp lesion from primary lung carcinoma  - PET Scan 10/13 Large FDG-avid destructive lesion in right parietal calvarium with an associated soft tissue mass which extends to the scalp and intracranially.  - Underlying mets to the calvarium  - MRI 10/20 showing Marked expansion and abnormal signal within the entire right posterior parietal and occipital calvarium crossing the midline to include the left occipital calvarium, measuring at least 13 cm in length by 2 cm in width (largest dimension), compatible with known large osseous metastasis. Abnormal signal along the entire dura underlying the lesion, most suggestive of dural infiltration. No invasion of the brain parenchyma. Moderate mass effect upon the right posterior parietal lobe, right occipital lobe, and right posterior temporal lobe secondary to the bony metastasis. There is a dominant overlying soft tissue component measuring approximately 3.6 x 1.4 cm. Several additional scattered subcentimeter foci of enhancement within the rest of the calvarium, suggestive of additional very small osseous metastases.    -Interventional pulm consulted for biopsy of the lung mass, plan for EBUS/bron on Tuesday 11/2. 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  -agree with Pall Care consult for further pain management and GOC  -Monitor Cr  -No plans for inpatient chemotherapy  -Rest of care as per primary team  -Patient to followup at [facility] upon discharge  -C/w Supportive care, pain control, Nutrition, PT, DVT ppx  -Oncology will continue to follow with you      Case discussed with Dr. Yen Small PA  Oncology Physician Assistant  Judy ISLAS/LEBRON [facility]  Pager [phone] also available on Teams    If before 8am/after 5pm or on weekends please page On-call Oncology Fellow

## 2022-10-31 NOTE — PROGRESS NOTE ADULT - PROBLEM SELECTOR PLAN 6
- Sodium 133 on admission. No history of Hyponatremia per chart review.  - Possibly secondary to poor PO intake vs SIADH in setting of lung malignancy  - Trend BMP  - Encourage PO intake  - Problem is resolved to date

## 2022-10-31 NOTE — DISCHARGE NOTE PROVIDER - CARE PROVIDERS DIRECT ADDRESSES
,[email or web address],DirectAddress_Unknown,DirectAddress_Unknown

## 2022-10-31 NOTE — DISCHARGE NOTE PROVIDER - NSDCCPTREATMENT_GEN_ALL_CORE_FT
PRINCIPAL PROCEDURE  Procedure: CT head wo con  Findings and Treatment: ACC: [id number] EXAM:  CT GUIDE STEREO LOC                        PROCEDURE DATE:  10/26/2022    INTERPRETATION:  Clinical indication: Lung cancer and bone metastasis.  Axial sections were performed from base skull to vertex without contrast   enhancement.  Parenchymal volume loss is identified  There is evidence of a large area of osseous destruction involving the   right posterior temporal/posterior frontal parietal region. There is a   large associated soft tissue component seen with intracranial and large   extracalvarial component as well. This finding could be compatible with   underlying metastasis given patient's history. No significant shift or   herniation is seen.  Sphenoid sinus mucosal thickness is seen on the right side.  IMPRESSION: Large area of osseous destruction involving the calvarium as   described above. Associated soft tissue component with intracranial and   extracalvarial extension is identified as well.  --- End of Report ---       PRINCIPAL PROCEDURE  Procedure: CT head wo con  Findings and Treatment: ACC: [id number] EXAM:  CT GUIDE STEREO LOC                        PROCEDURE DATE:  10/26/2022    INTERPRETATION:  Clinical indication: Lung cancer and bone metastasis.  Axial sections were performed from base skull to vertex without contrast   enhancement.  Parenchymal volume loss is identified  There is evidence of a large area of osseous destruction involving the   right posterior temporal/posterior frontal parietal region. There is a   large associated soft tissue component seen with intracranial and large   extracalvarial component as well. This finding could be compatible with   underlying metastasis given patient's history. No significant shift or   herniation is seen.  Sphenoid sinus mucosal thickness is seen on the right side.  IMPRESSION: Large area of osseous destruction involving the calvarium as   described above. Associated soft tissue component with intracranial and   extracalvarial extension is identified as well.  --- End of Report ---      SECONDARY PROCEDURE  Procedure: MR pelvis wo/w con  Findings and Treatment: PROCEDURE DATE:  10/27/2022    INTERPRETATION:  CLINICAL INFORMATION: Likely hydrometra. Evaluate for   cervical mass.  COMPARISON: CT 10/19/2022.  PROCEDURE:  MRI of the pelvis was performed with and without intravenous contrast.  5 ml of Gadavist was administered intravenously without complications and   5 ml was discarded.  FINDINGS:  UTERUS: 9.4 x 6.3 x 4.4 cm. Small intramural fundal fibroid of 11 mm.  ENDOMETRIUM: Markedly distended with minimally complex fluid measuring up   to 3.7 cm. No internal enhancement. No abnormal enhancement of the cervix   to suggest a focal mass.  JUNCTIONAL ZONE: Not visualized.  RIGHT OVARY: 1.7 x 0.9 x 1.7 cm and normal in appearance.  LEFT OVARY: 1.1 x 0.8 x 1.8 cm and normal in appearance.  ADNEXA: Within normal limits.  BLADDER: Within normal limits.  LYMPH NODES: No pelvic lymphadenopathy.  VISUALIZED PORTIONS:  ABDOMINAL ORGANS: Mild to moderate right hydronephrosis likely in a UPJ   pattern. Adenomyomatosis and gallstones.  BOWEL: Within normal limits.  PERITONEUM: No ascites.  VESSELS: Atherosclerotic changes.  ABDOMINAL WALL: Within normal limits.  BONES: Within normal limits.  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  --- End of Report ---       PRINCIPAL PROCEDURE  Procedure: CT head wo con  Findings and Treatment: ACC: [id number] EXAM:  CT GUIDE STEREO LOC                        PROCEDURE DATE:  10/26/2022    INTERPRETATION:  Clinical indication: Lung cancer and bone metastasis.  Axial sections were performed from base skull to vertex without contrast   enhancement.  Parenchymal volume loss is identified  There is evidence of a large area of osseous destruction involving the   right posterior temporal/posterior frontal parietal region. There is a   large associated soft tissue component seen with intracranial and large   extracalvarial component as well. This finding could be compatible with   underlying metastasis given patient's history. No significant shift or   herniation is seen.  Sphenoid sinus mucosal thickness is seen on the right side.  IMPRESSION: Large area of osseous destruction involving the calvarium as   described above. Associated soft tissue component with intracranial and   extracalvarial extension is identified as well.  --- End of Report ---      SECONDARY PROCEDURE  Procedure: MR pelvis wo/w con  Findings and Treatment: PROCEDURE DATE:  10/27/2022    INTERPRETATION:  CLINICAL INFORMATION: Likely hydrometra. Evaluate for   cervical mass.  COMPARISON: CT 10/19/2022.  PROCEDURE:  MRI of the pelvis was performed with and without intravenous contrast.  5 ml of Gadavist was administered intravenously without complications and   5 ml was discarded.  FINDINGS:  UTERUS: 9.4 x 6.3 x 4.4 cm. Small intramural fundal fibroid of 11 mm.  ENDOMETRIUM: Markedly distended with minimally complex fluid measuring up   to 3.7 cm. No internal enhancement. No abnormal enhancement of the cervix   to suggest a focal mass.  JUNCTIONAL ZONE: Not visualized.  RIGHT OVARY: 1.7 x 0.9 x 1.7 cm and normal in appearance.  LEFT OVARY: 1.1 x 0.8 x 1.8 cm and normal in appearance.  ADNEXA: Within normal limits.  BLADDER: Within normal limits.  LYMPH NODES: No pelvic lymphadenopathy.  VISUALIZED PORTIONS:  ABDOMINAL ORGANS: Mild to moderate right hydronephrosis likely in a UPJ   pattern. Adenomyomatosis and gallstones.  BOWEL: Within normal limits.  PERITONEUM: No ascites.  VESSELS: Atherosclerotic changes.  ABDOMINAL WALL: Within normal limits.  BONES: Within normal limits.  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  --- End of Report ---      Procedure: Limited ultrasound of pelvis  Findings and Treatment: ACC: [id number] EXAM: US PELVIC COMPLETE  PROCEDURE DATE: 10/26/2022  INTERPRETATION: CLINICAL INFORMATION: Hydrometra identified on recent CT. Evaluate for cervical mass as etiology. New diagnosis of lung cancer.  LMP: Postmenopausal.  COMPARISON: CT 10/19/2022.  TECHNIQUE:  Transabdominal pelvic sonogram only. Patient declined transvaginal examination.  FINDINGS:  Uterus: 8.1 x 3.5 x 4.4 cm. Within normal limits.  Endometrium: Markedly distended with fluid.  Right ovary: Not visualized.  Left ovary: Not visualized.  Fluid: None.  IMPRESSION:  A suboptimal transabdominal evaluation of the pelvis demonstrates a markedly distended endometrial cavity. An MRI pelvis with gadolinium would be helpful in better evaluation of the cervix for a focal lesion has been scheduled.  --- End of Report ---

## 2022-10-31 NOTE — DISCHARGE NOTE PROVIDER - NPI NUMBER (FOR SYSADMIN USE ONLY) :
[[id number]] [[id number]],[[id number]] [[id number]],[[id number]],[[id number]],[[id number]],[UNKNOWN],[UNKNOWN]

## 2022-10-31 NOTE — PROGRESS NOTE ADULT - PROBLEM SELECTOR PLAN 2
- Solitary lesion to the right parietal scalp biopsied and found to be metastatic scalp lesion from primary lung carcinoma  - PET Scan 10/13 Large FDG-avid destructive lesion in right parietal calvarium with an associated soft tissue mass which extends to the scalp and intracranially.  - Underlying mets to the calvarium  - MRI 10/20 showing Marked expansion and abnormal signal within the entire right posterior parietal and occipital calvarium crossing the midline to include the left occipital calvarium, measuring at least 13 cm in length by 2 cm in width (largest dimension), compatible with known large osseous metastasis. Abnormal signal along the entire dura underlying the lesion, most suggestive of dural infiltration. No invasion of the brain parenchyma. Moderate mass effect upon the right posterior parietal lobe, right occipital lobe, and right posterior temporal lobe secondary to the bony metastasis. There is a dominant overlying soft tissue component measuring approximately 3.6 x 1.4 cm. Several additional scattered subcentimeter foci of enhancement within the rest of the calvarium, suggestive of additional very small osseous metastases.  - Neurosurgery on board. They recommend no interventions at this time  - Rad Onc consulted, pt to f/u outpatient, does not wish to pursue radiation inpatient at this time.   - Stereotactic head CT large area of osseous destruction involving the calvarium intracranial or extracalvarial

## 2022-10-31 NOTE — PROGRESS NOTE ADULT - PROBLEM SELECTOR PLAN 5
- Predominantly photopenic pelvic mass with mild, peripheral FDG avidity is indeterminate. Contrast-enhanced CT or MRI is recommended for further evaluation.  - Pelvic US on 10/26 suboptimal study   - 10/27/22: Pelvic MRI hydrometria w/o cervical mass  - f/u with outpatient GYN

## 2022-10-31 NOTE — PROGRESS NOTE ADULT - SUBJECTIVE AND OBJECTIVE BOX
INTERVAL HPI/OVERNIGHT EVENTS:  Patient seen at bedside.  Patient with no acute complaints  Anticipating bronchoscopy tomorrow    VITAL SIGNS:  T(F): 97.7 (10-31-22 @ 11:49)  HR: 84 (10-31-22 @ 11:49)  BP: 139/68 (10-31-22 @ 11:49)  RR: 17 (10-31-22 @ 11:49)  SpO2: 97% (10-31-22 @ 11:49)  Wt(kg): --    PHYSICAL EXAM:  GENERAL: NAD, lying in bed comfortably  HEAD:  Atraumatic, normocephalic  EYES: EOMI, PERRLA, conjunctiva and sclera clear  ENT: Moist mucous membranes  NECK: Supple, no JVD  HEART: Regular rate and rhythm, no murmurs, rubs, or gallops  LUNGS: Unlabored respirations.  Clear to auscultation bilaterally, no crackles, wheezing, or rhonchi  ABDOMEN: Soft, nontender, nondistended, +BS  EXTREMITIES: 2+ peripheral pulses bilaterally. No clubbing, cyanosis, or edema  NERVOUS SYSTEM:  A&Ox3, no focal deficits   SKIN: 2x2cm indurated hard lesion on the right parietal scalp, with crusting and flaking. No discharge, no oozing      MEDICATIONS  (STANDING):  cefTRIAXone   IVPB 1000 milliGRAM(s) IV Intermittent every 24 hours  chlorhexidine 2% Cloths 1 Application(s) Topical once  enoxaparin Injectable 30 milliGRAM(s) SubCutaneous every 24 hours  influenza  Vaccine (HIGH DOSE) 0.7 milliLiter(s) IntraMuscular once  levETIRAcetam 500 milliGRAM(s) Oral two times a day  mupirocin 2% Ointment 1 Application(s) Topical two times a day  polyethylene glycol 3350 17 Gram(s) Oral two times a day  senna 2 Tablet(s) Oral at bedtime    MEDICATIONS  (PRN):  acetaminophen     Tablet .. 650 milliGRAM(s) Oral every 6 hours PRN Mild Pain (1 - 3)  ALPRAZolam 0.5 milliGRAM(s) Oral every 8 hours PRN anxiety  bisacodyl Suppository 10 milliGRAM(s) Rectal once PRN Constipation  melatonin 3 milliGRAM(s) Oral at bedtime PRN Insomnia  morphine  - Injectable 2 milliGRAM(s) IV Push every 24 hours PRN prior to RT  oxyCODONE    IR 2.5 milliGRAM(s) Oral every 6 hours PRN Moderate Pain (4 - 6)      Allergies    No Known Allergies    Intolerances        LABS:                        10.8   10.31 )-----------( 254      ( 31 Oct 2022 06:40 )             32.8     10-31    136  |  102  |  37<H>  ----------------------------<  92  4.5   |  24  |  1.29    Ca    9.8      31 Oct 2022 06:40  Phos  4.4     10-31  Mg     2.10     10-31            RADIOLOGY & ADDITIONAL TESTS:  Studies reviewed.

## 2022-10-31 NOTE — PROGRESS NOTE ADULT - PROBLEM SELECTOR PLAN 7
- Leucocytosis possibly reactive in setting of metastasis  - Monitor off abx  - Trend WBC and fever curve  - problem is resolved to date

## 2022-10-31 NOTE — PROGRESS NOTE ADULT - ASSESSMENT
82y F with RUL lung mass, right posterior parietal occipital calvarial lesion crossing midline to left with moderate mass effect upon right parietal and occipital lobe, no invasion of brain parenchyma. Course complicated by a UTI. Pending EBUS on 11/1/22.

## 2022-10-31 NOTE — PROGRESS NOTE ADULT - SUBJECTIVE AND OBJECTIVE BOX
Pulmonary Consult Follow up     Interval Events:          REVIEW OF SYSTEMS:  [x] All other systems negative except per HPI   [ ] Unable to assess ROS because ________    OBJECTIVE:  ICU Vital Signs Last 24 Hrs  T(C): 36.5 (31 Oct 2022 11:49), Max: 36.9 (31 Oct 2022 06:25)  T(F): 97.7 (31 Oct 2022 11:49), Max: 98.4 (31 Oct 2022 06:25)  HR: 84 (31 Oct 2022 11:49) (65 - 84)  BP: 139/68 (31 Oct 2022 11:49) (138/61 - 156/62)  BP(mean): --  ABP: --  ABP(mean): --  RR: 17 (31 Oct 2022 11:49) (17 - 17)  SpO2: 97% (31 Oct 2022 11:49) (97% - 100%)    O2 Parameters below as of 31 Oct 2022 11:49  Patient On (Oxygen Delivery Method): room air                PHYSICAL EXAM:  GENERAL: NAD, well-groomed, well-developed  HEAD:  Atraumatic, Normocephalic  EYES: EOMI, conjunctiva and sclera clear  ENMT: Moist mucous membranes  CHEST/LUNG: Clear to auscultation bilaterally; No rales, rhonchi, wheezing, or rubs  HEART: Regular rate and rhythm; No murmurs, rubs, or gallops  ABDOMEN: Nondistended  VASCULAR: No  cyanosis, or edema  SKIN: No rashes or lesions  NERVOUS SYSTEM:  Alert & Oriented X3, Good concentration    HOSPITAL MEDICATIONS:  MEDICATIONS  (STANDING):  chlorhexidine 2% Cloths 1 Application(s) Topical once  influenza  Vaccine (HIGH DOSE) 0.7 milliLiter(s) IntraMuscular once  levETIRAcetam 500 milliGRAM(s) Oral two times a day  mupirocin 2% Ointment 1 Application(s) Topical two times a day  polyethylene glycol 3350 17 Gram(s) Oral two times a day  senna 2 Tablet(s) Oral at bedtime    MEDICATIONS  (PRN):  acetaminophen     Tablet .. 650 milliGRAM(s) Oral every 6 hours PRN Mild Pain (1 - 3)  ALPRAZolam 0.5 milliGRAM(s) Oral every 8 hours PRN anxiety  bisacodyl Suppository 10 milliGRAM(s) Rectal once PRN Constipation  magnesium hydroxide Suspension 30 milliLiter(s) Oral daily PRN Constipation  melatonin 3 milliGRAM(s) Oral at bedtime PRN Insomnia  morphine  - Injectable 2 milliGRAM(s) IV Push every 24 hours PRN prior to RT  oxyCODONE    IR 2.5 milliGRAM(s) Oral every 6 hours PRN Moderate Pain (4 - 6)      LABS:  10-31    136  |  102  |  37<H>  ----------------------------<  92  4.5   |  24  |  1.29  10-30    136  |  100  |  28<H>  ----------------------------<  96  4.1   |  24  |  1.31<H>  10-29    135  |  102  |  22  ----------------------------<  98  4.0   |  23  |  1.34<H>    Ca    9.8      31 Oct 2022 06:40  Ca    9.9      30 Oct 2022 06:05  Ca    9.9      29 Oct 2022 05:47  Phos  4.4     10-31  Mg     2.10     10-31      Magnesium, Serum: 2.10 mg/dL (10-31-22 @ 06:40)  Magnesium, Serum: 2.10 mg/dL (10-30-22 @ 06:05)  Magnesium, Serum: 2.30 mg/dL (10-29-22 @ 05:47)    Phosphorus Level, Serum: 4.4 mg/dL (10-31-22 @ 06:40)  Phosphorus Level, Serum: 4.3 mg/dL (10-30-22 @ 06:05)  Phosphorus Level, Serum: 3.6 mg/dL (10-29-22 @ 05:47)                                              10.8   10.31 )-----------( 254      ( 31 Oct 2022 06:40 )             32.8                         10.4   8.39  )-----------( 250      ( 30 Oct 2022 06:05 )             31.1                         10.9   10.61 )-----------( 244      ( 29 Oct 2022 05:47 )             34.0     CAPILLARY BLOOD GLUCOSE

## 2022-10-31 NOTE — DISCHARGE NOTE PROVIDER - NSDCCAREPROVSEEN_GEN_ALL_CORE_FT
Attending Physician - Marc Freeman  Resident Physician - Ruperto Camarena  Resident Physician - Sarah Polk  Attending Physician -  Marc Freeman  Resident Physician -  Ruperto Camarena  Resident Physician -  Sarah Polk

## 2022-10-31 NOTE — DISCHARGE NOTE PROVIDER - HOSPITAL COURSE
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  A biopsy of a scalp lesion several weeks ago revealed metastatic adenocarcinoma.  Further work up with MRI PET was concerning for primary lung cancer.  Pulmonary was consulted for biopsy and performed EBUS with biopsy on 10/31    Pt was seen by Palliative care team on and made DNR/DNI.   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  A biopsy of a scalp lesion several weeks ago revealed metastatic adenocarcinoma.  Further work up with MRI PET was concerning for primary lung cancer.  Pulmonary was consulted for biopsy and performed Endobronchial ultrasound with biopsy on 11/1/22 with surgical pathology still pending upon discharge.     Pt was seen by Palliative care team on and made DNR/DNI.   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  A biopsy of a scalp lesion several weeks ago revealed metastatic adenocarcinoma. Her PETCT shows mass in lung and multiple adenopathy consistent with lung primary. Her initial biopsy is from outside is small specimen and needed more tissue. Further work up with MRI PET was concerning for primary lung cancer.  Neuro-oncology was consulted due to patient's dural infiltration observed on imaging. On 10/27 the Neurology team spoke with neuro-oncologist, Dr. Min, who recommended starting Keppra 500mg twice daily for seizure prophylaxis. Pt went for simulation with Radiation Oncology which she found uncomfortable and wished to not further pursue radiation therapy for the duration of the inpatient course and wished to f/u outpatient. Pulmonary was consulted for biopsy and performed Endobronchial ultrasound with biopsy on 11/1/22 with surgical pathology still pending upon discharge. Pt was seen by Palliative care team on and made DNR/DNI on 10/27/22. During inpatient course,       - PET Scan 10/13 Large FDG-avid destructive lesion in right parietal calvarium with an associated soft tissue mass which extends to the scalp and intracranially.  - Underlying mets to the calvarium  - MRI 10/20 showing Marked expansion and abnormal signal within the entire right posterior parietal and occipital calvarium crossing the midline to include the left occipital calvarium, measuring at least 13 cm in length by 2 cm in width (largest dimension), compatible with known large osseous metastasis. Abnormal signal along the entire dura underlying the lesion, most suggestive of dural infiltration. No invasion of the brain parenchyma. Moderate mass effect upon the right posterior parietal lobe, right occipital lobe, and right posterior temporal lobe secondary to the bony metastasis. There is a dominant overlying soft tissue component measuring approximately 3.6 x 1.4 cm. Several additional scattered subcentimeter foci of enhancement within the rest of the calvarium, suggestive of additional very small osseous metastases.     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  A biopsy of a scalp lesion several weeks ago revealed metastatic adenocarcinoma. Her PETCT shows mass in lung and multiple adenopathy consistent with lung primary. Her initial biopsy is from outside is small specimen and needed more tissue. Further work up with MRI PET was concerning for primary lung cancer.  Neuro-oncology was consulted due to patient's dural infiltration observed on imaging. On 10/27 the Neurology team spoke with neuro-oncologist, Dr. Min, who recommended starting Keppra 500mg twice daily for seizure prophylaxis. Pt went for simulation with Radiation Oncology which she found uncomfortable and wished to not further pursue radiation therapy for the duration of the inpatient course and wished to f/u outpatient. Pulmonary was consulted for biopsy and performed Endobronchial ultrasound with biopsy on 11/1/22 with surgical pathology still pending upon discharge. Pt was seen by Palliative care team on and made DNR/DNI on 10/27/22. Pt's course was complicated by UTI in which she demonstrated dysuria and had positive UTI with Ucx from 10/29 that grew quinolone-resistant E coli >100,000 CFU/ml and 10,000-49,000 CFU/mL of Proteus mirabilis which demonstrated sensitivities of _______. Patient was treated with IV ceftriaxone for 3 days with resolution of symptoms.     In addition, patient went for pelvic MRI to evaluate for Hydrometra that was found on CT abdomen/pelvis imaging. MR of the pelvis was performed with and without contrast which   demonstrated Hydrometra without evidence of obvious cervical mass.       Of note, during inpatient course, patient intermittently complained of headache which was well controlled with PRN regimens of Tylenol and oxycodone 2.5 mg q6h. Patient was also given Xanax 0.5 mg PRN q8h for anxiety throughout hospital course.       - PET Scan 10/13 Large FDG-avid destructive lesion in right parietal calvarium with an associated soft tissue mass which extends to the scalp and intracranially.  - Underlying mets to the calvarium  - MRI 10/20 showing Marked expansion and abnormal signal within the entire right posterior parietal and occipital calvarium crossing the midline to include the left occipital calvarium, measuring at least 13 cm in length by 2 cm in width (largest dimension), compatible with known large osseous metastasis. Abnormal signal along the entire dura underlying the lesion, most suggestive of dural infiltration. No invasion of the brain parenchyma. Moderate mass effect upon the right posterior parietal lobe, right occipital lobe, and right posterior temporal lobe secondary to the bony metastasis. There is a dominant overlying soft tissue component measuring approximately 3.6 x 1.4 cm. Several additional scattered subcentimeter foci of enhancement within the rest of the calvarium, suggestive of additional very small osseous metastases.         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  A biopsy of a scalp lesion several weeks ago revealed metastatic adenocarcinoma. Her PETCT shows mass in lung and multiple adenopathy consistent with lung primary. Her initial biopsy is from outside is small specimen and needed more tissue. Further work up with MRI PET was concerning for primary lung cancer.  Neuro-oncology was consulted due to patient's dural infiltration observed on imaging. On 10/27 the Neurology team spoke with neuro-oncologist, Dr. Min, who recommended starting Keppra 500mg twice daily for seizure prophylaxis. Pt went for simulation with Radiation Oncology which she found uncomfortable and wished to not further pursue radiation therapy for the duration of the inpatient course and wished to f/u outpatient. Pulmonary was consulted for biopsy and performed Endobronchial ultrasound with biopsy on 11/1/22 with surgical pathology still pending upon discharge. Pt was seen by Palliative care team on and made DNR/DNI on 10/27/22. Pt's course was complicated by UTI in which she demonstrated dysuria and had positive UTI with Ucx from 10/29 that grew quinolone-resistant E coli >100,000 CFU/ml and 10,000-49,000 CFU/mL of Proteus mirabilis which demonstrated sensitivities of _______. Patient was treated with IV ceftriaxone for 3 days with resolution of symptoms.     In addition, patient went for pelvic MRI to evaluate for Hydrometra that was found on CT abdomen/pelvis imaging. MR of the pelvis was performed with and without contrast which   demonstrated Hydrometra without evidence of obvious cervical mass. Pt to f/u with outpatient Gynecologist for this result.     Of note, during inpatient course, patient intermittently complained of headache which was well controlled with PRN regimens of Tylenol and oxycodone 2.5 mg q6h. Patient was also given Xanax 0.5 mg PRN q8h for anxiety throughout hospital course.       - PET Scan 10/13 Large FDG-avid destructive lesion in right parietal calvarium with an associated soft tissue mass which extends to the scalp and intracranially.  - Underlying mets to the calvarium  - MRI 10/20 showing Marked expansion and abnormal signal within the entire right posterior parietal and occipital calvarium crossing the midline to include the left occipital calvarium, measuring at least 13 cm in length by 2 cm in width (largest dimension), compatible with known large osseous metastasis. Abnormal signal along the entire dura underlying the lesion, most suggestive of dural infiltration. No invasion of the brain parenchyma. Moderate mass effect upon the right posterior parietal lobe, right occipital lobe, and right posterior temporal lobe secondary to the bony metastasis. There is a dominant overlying soft tissue component measuring approximately 3.6 x 1.4 cm. Several additional scattered subcentimeter foci of enhancement within the rest of the calvarium, suggestive of additional very small osseous metastases.         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  A biopsy of a scalp lesion several weeks ago revealed metastatic adenocarcinoma. Her PETCT shows mass in lung and multiple adenopathy consistent with lung primary. Her initial biopsy is from outside is small specimen and needed more tissue. Further work up with MRI PET was concerning for primary lung cancer.  Neuro-oncology was consulted due to patient's dural infiltration observed on imaging. On 10/27 the Neurology team spoke with neuro-oncologist, Dr. Min, who recommended starting Keppra 500mg twice daily for seizure prophylaxis. Pt went for simulation with Radiation Oncology which she found uncomfortable and wished to not further pursue radiation therapy for the duration of the inpatient course and wished to f/u outpatient. Pulmonary was consulted for biopsy and performed Endobronchial ultrasound with biopsy on 11/1/22 with surgical pathology still pending upon discharge. Pt was seen by Palliative care team on and made DNR/DNI on 10/27/22. Pt's course was complicated by UTI in which she demonstrated dysuria and had positive UTI with Ucx from 10/29 that grew quinolone-resistant E coli >100,000 CFU/ml and 10,000-49,000 CFU/mL of Proteus mirabilis. Patient was treated with IV ceftriaxone for 3 days with resolution of symptoms.     In addition, patient went for pelvic MRI to evaluate for Hydrometra that was found on CT abdomen/pelvis imaging. MR of the pelvis was performed with and without contrast which   demonstrated Hydrometra without evidence of obvious cervical mass. Pt to f/u with outpatient Gynecologist for this result.     Of note, during inpatient course, patient intermittently complained of headache which was well controlled with PRN regimens of Tylenol and oxycodone 2.5 mg q6h. Patient was also given Xanax 0.5 mg PRN q8h for anxiety throughout hospital course.       - PET Scan 10/13 Large FDG-avid destructive lesion in right parietal calvarium with an associated soft tissue mass which extends to the scalp and intracranially.  - Underlying mets to the calvarium  - MRI 10/20 showing Marked expansion and abnormal signal within the entire right posterior parietal and occipital calvarium crossing the midline to include the left occipital calvarium, measuring at least 13 cm in length by 2 cm in width (largest dimension), compatible with known large osseous metastasis. Abnormal signal along the entire dura underlying the lesion, most suggestive of dural infiltration. No invasion of the brain parenchyma. Moderate mass effect upon the right posterior parietal lobe, right occipital lobe, and right posterior temporal lobe secondary to the bony metastasis. There is a dominant overlying soft tissue component measuring approximately 3.6 x 1.4 cm. Several additional scattered subcentimeter foci of enhancement within the rest of the calvarium, suggestive of additional very small osseous metastases.

## 2022-10-31 NOTE — PROGRESS NOTE ADULT - ASSESSMENT
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  A biopsy of a scalp lesion several weeks ago revealed metastatic adenocarcinoma.  Further work up with MRI PET was concerning for primary lung cancer.  Pulmonary was consulted for further evaluation.      #Adenocarcinoma of unclear origin  - per outpatient records patient underwent scalp biopsy 8/31/22  which demonstrated metastatic adenocarcinoma with mucinous differentiation, with phenotypic studies supportive of upper GI origin  - CT head 10/26/22 demonstrating large area of osseous destruction involving calvarium  - CT chest 10/19/22 demonstrating RUL spiculated nodule measuring 1.8 cm, no prior imaging for comparison     Recommendations  - will plan for bronchoscopy with biopsy tomorrow - scheduled for 2:30pm   - NPO after midnight  - Hold heparin after midnight  - Check CBC, CMP, Pt/INR, PTT overnight    - provide and encourage incentive spirometry while inpatient

## 2022-10-31 NOTE — PROGRESS NOTE ADULT - NS ATTEND AMEND GEN_ALL_CORE FT
Patient seen at bedside. Case discussed with MARY KAY Small. Plan as above.   RT deferred to outpatient per pt request  For bronchoscopy and biopsy tomorrow  Pain management  OK to dc after bx with outpatient follow up  Will follow
Patient seen at bedside. Case discussed with MARY KAY Small. Plan as above.

## 2022-10-31 NOTE — DISCHARGE NOTE PROVIDER - NSDCFUADDAPPT_GEN_ALL_CORE_FT
APPTS ARE READY TO BE MADE: [ ] YES      Additional Information about above appointments (if needed):    1: Please follow-up with your primary care physician in one week.   2:   3:     Other comments or requests:    APPTS ARE READY TO BE MADE: [X] YES      Additional Information about above appointments (if needed):    1: Please follow-up with your primary care physician in one week.   2: Please follow-up with a Gynecologist within two weeks to follow-up on the findings of your Pelvic MRI.   3:     Other comments or requests:    APPTS ARE READY TO BE MADE: [X] YES      Additional Information about above appointments (if needed):    1: Please follow-up with your primary care physician in one week.   2: Please follow-up with a Gynecologist within two weeks to follow-up on the findings of your Pelvic MRI.   3: Please follow-up with the neurologist within one week  4. Please follow-up with the neuro-oncologist within one week.   5.     Other comments or requests:    APPTS ARE READY TO BE MADE: [X] YES      Additional Information about above appointments (if needed):    1: Please follow-up with your primary care physician in one week of being discharged.    2: Please follow-up with a Gynecologist within two weeks of being discharged to follow-up on the findings of your Pelvic MRI.   3: Please follow-up with the neurologist within one week of being discharged.  4. Please follow-up with the neuro-oncologist within one week of being discharged.   5. Please follow-up with the neurosurgeon in one week of being discharged.     Other comments or requests:

## 2022-10-31 NOTE — DISCHARGE NOTE PROVIDER - CARE PROVIDER_API CALL
Alexander Altamirano J  Internal Medicine  [address] [address]  Phone: [phone]  Fax: [phone]  Established Patient  Follow Up Time: 1 week   Alexander Altamirano   Internal Medicine  [address] [place] [address]  Phone: [phone]  Fax: [phone]  Established Patient  Follow Up Time: 1 week    AttarianLaure)  Internal Medicine; Medical Oncology  [address] [place] [address]  Phone: [phone]  Fax: [phone]  Established Patient  Follow Up Time: 1 week   Alexander Altamirano  Internal Medicine  [address] [place] [address]  Phone: [phone]  Fax: [phone]  Established Patient  Follow Up Time: 1 week    Laure Barlow)  Internal Medicine; Medical Oncology  [address] [place] [address]  Phone: [phone]  Fax: [phone]  Established Patient  Follow Up Time: 1 week    Khushi Min)  Neurology  [address] [place] [address]  Phone: [phone]  Fax: [phone]  Established Patient  Follow Up Time: 1 week    Arnaldo Leal  RADIATION ONCOLOGY  [address] [place] [address] - [place] [address]  Phone: [phone]  Fax: [phone]  Established Patient  Follow Up Time: 2 weeks    betsy,   Phone: (   )    -  Fax: (   )    -  Follow Up Time:     Janak Llanos  [address] [place] [address]  Phone: [phone]  Fax: (   )    -  Established Patient  Follow Up Time: 1 week

## 2022-10-31 NOTE — DISCHARGE NOTE PROVIDER - NSDCCPCAREPLAN_GEN_ALL_CORE_FT
PRINCIPAL DISCHARGE DIAGNOSIS  Diagnosis: Lung nodules  Assessment and Plan of Treatment:       SECONDARY DISCHARGE DIAGNOSES  Diagnosis: Cervical mass  Assessment and Plan of Treatment:      PRINCIPAL DISCHARGE DIAGNOSIS  Diagnosis: Lung nodules  Assessment and Plan of Treatment:       SECONDARY DISCHARGE DIAGNOSES  Diagnosis: Cervical mass  Assessment and Plan of Treatment:     Diagnosis: Acute UTI  Assessment and Plan of Treatment: Urinary Tract Infection  A urinary tract infection (UTI) is an infection of any part of the urinary tract, which includes the kidneys, ureters, bladder, and urethra. Risk factors include ignoring your need to urinate, wiping back to front if female, being an uncircumcised male, and having diabetes or a weak immune system. Symptoms include frequent urination, pain or burning with urination, foul smelling urine, cloudy urine, pain in the lower abdomen, blood in the urine, and fever. If you were prescribed an antibiotic medicine, take it as told by your health care provider. Do not stop taking the antibiotic even if you start to feel better.  SEEK IMMEDIATE MEDICAL CARE IF YOU HAVE ANY OF THE FOLLOWING SYMPTOMS: severe back or abdominal pain, fever, inability to keep fluids or medicine down, dizziness/lightheadedness, or a change in mental status.     PRINCIPAL DISCHARGE DIAGNOSIS  Diagnosis: Lung nodules  Assessment and Plan of Treatment: You had imaging performed that demonstrated a mass in your lung as well as enlarged lymph nodes. These findings are highly suspicious for lung cancer.         SECONDARY DISCHARGE DIAGNOSES  Diagnosis: Cervical mass  Assessment and Plan of Treatment: You had a suspicious finding on CT imaging    Diagnosis: Acute UTI  Assessment and Plan of Treatment: Urinary Tract Infection  A urinary tract infection (UTI) is an infection of any part of the urinary tract, which includes the kidneys, ureters, bladder, and urethra. Risk factors include ignoring your need to urinate, wiping back to front if female, being an uncircumcised male, and having diabetes or a weak immune system. Symptoms include frequent urination, pain or burning with urination, foul smelling urine, cloudy urine, pain in the lower abdomen, blood in the urine, and fever. If you were prescribed an antibiotic medicine, take it as told by your health care provider. Do not stop taking the antibiotic even if you start to feel better.  SEEK IMMEDIATE MEDICAL CARE IF YOU HAVE ANY OF THE FOLLOWING SYMPTOMS: severe back or abdominal pain, fever, inability to keep fluids or medicine down, dizziness/lightheadedness, or a change in mental status.    Diagnosis: Brain metastasis  Assessment and Plan of Treatment:      PRINCIPAL DISCHARGE DIAGNOSIS  Diagnosis: Lung nodules  Assessment and Plan of Treatment: You had imaging performed that demonstrated a mass in your lung as well as enlarged lymph nodes. These findings are highly suspicious for lung cancer. You underwent a procedure called an Endobronchial Ultrasound with biopsy in order to obtain a biopsy sample to confirm the         SECONDARY DISCHARGE DIAGNOSES  Diagnosis: Cervical mass  Assessment and Plan of Treatment: You had a suspicious finding on CT imaging    Diagnosis: Acute UTI  Assessment and Plan of Treatment: Urinary Tract Infection  A urinary tract infection (UTI) is an infection of any part of the urinary tract, which includes the kidneys, ureters, bladder, and urethra. Risk factors include ignoring your need to urinate, wiping back to front if female, being an uncircumcised male, and having diabetes or a weak immune system. Symptoms include frequent urination, pain or burning with urination, foul smelling urine, cloudy urine, pain in the lower abdomen, blood in the urine, and fever. If you were prescribed an antibiotic medicine, take it as told by your health care provider. Do not stop taking the antibiotic even if you start to feel better.  SEEK IMMEDIATE MEDICAL CARE IF YOU HAVE ANY OF THE FOLLOWING SYMPTOMS: severe back or abdominal pain, fever, inability to keep fluids or medicine down, dizziness/lightheadedness, or a change in mental status.    Diagnosis: Brain metastasis  Assessment and Plan of Treatment:      PRINCIPAL DISCHARGE DIAGNOSIS  Diagnosis: Lung nodules  Assessment and Plan of Treatment: You had imaging performed that demonstrated a mass in your lung as well as enlarged lymph nodes. These findings are highly suspicious for lung cancer. You underwent a procedure called an Endobronchial Ultrasound with biopsy in order to obtain a biopsy sample to confirm the diagnosis of lung cancer. Please follow-up with your primary care physician and Oncologist Dr. Barlow to follow-up these results. If you experience any shortness of breath, chest pain, increased fatigue, severe headache, or coughing up any blood, please call your primary care physician and seek emergency medical attention.         SECONDARY DISCHARGE DIAGNOSES  Diagnosis: Brain metastasis  Assessment and Plan of Treatment: You have brain imaging findings that demonstrate that you have metastasis of cancer to your brain. You were seen by a neurosurgeon while in the hospital who did not recommend surgery while you were in the hospital but you should follow-up outpatient for continued evaluation and further management. You were seen by a radiation oncologist who recommended brain radiation. You should follow-up outpatient with the radiation oncologist. While you were in the hospital you were also seen by a neurology team and neuro-oncologist Dr. Khushi Min. They recommended that you begin a medication called Keppra to help prevent you from developing seizures. Masses in or around the brain make you more predisoposed to seizures. You should continue to take the Keppra as prescribed and follow-up with the Neurologist and Neuro-oncologist for continued evaluation and management.   SEEK IMMEDIATE MEDICAL CARE IF YOU HAVE ANY OF THE FOLLOWING SYMPTOMS: severe headache, changes in mental status, changes in vision, nausea/vomting, seizure lasting over 5 minutes, not waking up or persistent altered mental status after the seizure, or more frequent or worsening seizures.    Diagnosis: Cervical mass  Assessment and Plan of Treatment: You had a suspicious finding on CT imaging for a cervical mass. You had an MRI of the pelvis to follow-up this finding which did not demonstrate any obvious masses in the cervix but did demonstrate "hydrometra" which is a descriptive term that refers to a distended uterus filled with clear, non-infected fluid. You should follow-up with the gynecologist to be examined, review the imaging, and be further evaluated. If you experience any distension or changes in your abdomen, any vaginal bleeding, then please immediately contact your primary care physician and gynecologist, and please immediately seek medical attention.    Diagnosis: Acute UTI  Assessment and Plan of Treatment: Urinary Tract Infection  A urinary tract infection (UTI) is an infection of any part of the urinary tract, which includes the kidneys, ureters, bladder, and urethra. Risk factors include ignoring your need to urinate, wiping back to front if female, and having diabetes or a weak immune system. Symptoms include frequent urination, pain or burning with urination, foul smelling urine, cloudy urine, pain in the lower abdomen, blood in the urine, and fever. You received an IV antibiotic called Ceftriaxone and after completing a 3 day course of this antibiotic you had resolution of your urinary symptoms. You had a urine culture peformed that was positive for two bacteria called E. coli and Proteus mirabilis.  SEEK IMMEDIATE MEDICAL CARE IF YOU HAVE ANY OF THE FOLLOWING SYMPTOMS: severe back or abdominal pain, fever, inability to keep fluids or medicine down, dizziness/lightheadedness, or a change in mental status.

## 2022-11-01 NOTE — DISCHARGE NOTE NURSING/CASE MANAGEMENT/SOCIAL WORK - PATIENT PORTAL LINK FT
You can access the FollowMyHealth Patient Portal offered by [facility] by registering at the following website: [email or web address]. By joining [employer]’s FollowMyHealth portal, you will also be able to view your health information using other applications (apps) compatible with our system.

## 2022-11-01 NOTE — PROGRESS NOTE ADULT - ATTENDING COMMENTS
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  Planned for bronchoscopy with EBUS tomorrow.  NPO after midnight.  Check COVID PCR.  Hold Lovenox.
Agree with above. Planned for monarch robotic assisted wan bronch. Risks and benefits including possible interventions discussed. Role of EBUS discussed for biopsy of peripheral nodule. Alternative modalities discussed. Agreeable to proceed. Follow up path results. Further care per oncology team. Post op care and xray.
82W with history of heavy tobacco use who is presenting with RUL lung mass and right posterior parietal occipital calvarial lesion crossing midline to left with moderate mass effect upon right parietal and occipital lobe who is presenting with pain and need for tissue diagnosis.    Patient seen in the ED this morning. Her main complaint is head pain and anxiety. She states that she has been having trouble sleeping due to the uncertainty with her diagnosis.     # DIAMANTE mass - Presumed lung primary with metastatic disease to the calvarium.   # Calvarial mass - with mass effect on the right parietal and occipital lobe. Neurosurgery consulted, not planning for surgical intervention. Appreciate oncology eval will consult nuero-onc and rad onc.   # Hydrometra - pending pelvic US, may need MR for further characterization.     DVTppx: holding chemo ppx for possible procedure
82W with history of heavy tobacco use who is presenting with RUL lung mass and right posterior parietal occipital calvarial lesion crossing midline to left with moderate mass effect upon right parietal and occipital lobe who is presenting with pain from her calvarial mass and need for tissue diagnosis.     # DIAMANTE mass - Possible lung primary vs GI based off outside pathology. Interventional pulm consulted for EBUS, planning for Tuesday. Patient now agreeable to stay for procedure.  # Calvarial mass - with mass effect on the right parietal and occipital lobe. Neurosurgery consulted, not planning for surgical intervention. Rad onc completed simulation, however patient would like to defer RT to outpatient.   # Hydrometra - MR showed no obvious mass, can follow OP with GYN
82W with history of heavy tobacco use who is presenting with RUL lung mass and right posterior parietal occipital calvarial lesion crossing midline to left with moderate mass effect upon right parietal and occipital lobe who is presenting with pain from her calvarial mass and need for tissue diagnosis.     # DIAMANTE mass - Possible lung primary vs GI based off outside pathology. Pending interventional pulm consult for EBUS.   # Calvarial mass - with mass effect on the right parietal and occipital lobe. Neurosurgery consulted, not planning for surgical intervention. Rad onc completed simulation today, possible for RT tomorrow although patient not sure she wants to proceed given discomfort she had during simulation.  # Hydrometra - pending pelvic US, MR pending for further characterization.
82W with history of heavy tobacco use who is presenting with RUL lung mass and right posterior parietal occipital calvarial lesion crossing midline to left with moderate mass effect upon right parietal and occipital lobe who is presenting with pain from her calvarial mass and need for tissue diagnosis.    # DIAMANTE mass - Possible lung primary vs GI based off outside pathology. Interventional pulm planning for EBUS with biopsy today in OR.   # UTI - UCx growing E. coli, c/w ceftriaxone to complete 3 day course today.   # Calvarial mass - with mass effect on the right parietal and occipital lobe. Neurosurgery consulted, not planning for surgical intervention. Rad onc completed simulation, however patient would like to defer RT to outpatient.   # Hydrometra - MR showed no obvious mass, can follow OP with GYN    dispo: d/c home today vs tomorrow pending pulm recs. d/c planning 40 min coordinating care
82W with history of heavy tobacco use who is presenting with RUL lung mass and right posterior parietal occipital calvarial lesion crossing midline to left with moderate mass effect upon right parietal and occipital lobe who is presenting with pain from her calvarial mass and need for tissue diagnosis.    # DIAMANTE mass - Possible lung primary vs GI based off outside pathology. Interventional pulm consulted for EBUS, planning for tomorrow. NPO@2400  # UTI - UCx growing E. coli, c/w ceftriaxone. f/u sensitivities   # Calvarial mass - with mass effect on the right parietal and occipital lobe. Neurosurgery consulted, not planning for surgical intervention. Rad onc completed simulation, however patient would like to defer RT to outpatient.   # Hydrometra - MR showed no obvious mass, can follow OP with GYN
Patient seen and examined by myself , case discussed  with resident ,agree with the above finding and plan  82W with history of heavy tobacco use who is presenting with RUL lung mass and right posterior parietal occipital calvarial lesion crossing midline to left with moderate mass effect upon right parietal and occipital lobe who is presenting with pain from her calvarial mass and need for tissue diagnosis. Pt c/o dysuria today      # DIAMANTE mass - Possible lung primary vs GI based off outside pathology. Interventional pulm consulted for EBUS, planning for Tuesday. Patient now agreeable to stay for procedure.  # Calvarial mass - with mass effect on the right parietal and occipital lobe. Neurosurgery consulted, not planning for surgical intervention. Rad onc completed simulation, however patient would like to defer RT to outpatient.   # Hydrometra - MR showed no obvious mass, can follow OP with GYN  # dysuria : UA dirty, f/u Ucx , and started  Ceftriaxone  plan of care d/w pt and dtr at bedside
Patient seen and examined by myself , case discussed  with resident ,agree with the above finding and plan  82W with history of heavy tobacco use who is presenting with RUL lung mass and right posterior parietal occipital calvarial lesion crossing midline to left with moderate mass effect upon right parietal and occipital lobe who is presenting with pain from her calvarial mass and need for tissue diagnosis. Pt c/o dysuria today      # DIAMANTE mass - Possible lung primary vs GI based off outside pathology. Interventional pulm consulted for EBUS, planning for Tuesday. Patient now agreeable to stay for procedure.  # Calvarial mass - with mass effect on the right parietal and occipital lobe. Neurosurgery consulted, not planning for surgical intervention. Rad onc completed simulation, however patient would like to defer RT to outpatient.   # Hydrometra - MR showed no obvious mass, can follow OP with GYN  # dysuria : UA dirty, will check Ucx , and start Ceftriaxone  plan of care d/w pt and dtr at bedside

## 2022-11-01 NOTE — PROGRESS NOTE ADULT - TIME BILLING
Time time spent in review of laboratory data, radiology images and results, discussion with primary team/patient, and monitoring for potential decompensation. Interventions performed as documented above. In addition, time also spent to risks and benefits of the procedure, alternative procedures, diagnostic and therapeutic outcomes as well as further management plan. Complex patient requiring services. Interventional Pulmonology services provided to the patient were separate from general pulmonary service due to complexity of issues and interventions required. Time spent separate from time spent doing any procedures.

## 2022-11-01 NOTE — DISCHARGE NOTE NURSING/CASE MANAGEMENT/SOCIAL WORK - NSDCPEFALRISK_GEN_ALL_CORE
For information on Fall & Injury Prevention, visit: [email or web address] OR  [email or web address] OR  https://www.cdc.gov/steadi/patient.html

## 2022-11-01 NOTE — PROGRESS NOTE ADULT - PROBLEM SELECTOR PLAN 8
- Diet: Regular Diet  - DVT ppx: SQH  - Dispo: Pending malignancy workup

## 2022-11-01 NOTE — PROGRESS NOTE ADULT - ASSESSMENT
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  A biopsy of a scalp lesion several weeks ago revealed metastatic adenocarcinoma.  Further work up with MRI PET was concerning for primary lung cancer.  Pulmonary was consulted for further evaluation.      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  A biopsy of a scalp lesion several weeks ago revealed metastatic adenocarcinoma.  Further work up with MRI PET was concerning for primary lung cancer.      #Adenocarcinoma of unclear origin  - per outpatient records patient underwent scalp biopsy 8/31/22  which demonstrated metastatic adenocarcinoma with mucinous differentiation, with phenotypic studies supportive of upper GI origin  - CT head 10/26/22 demonstrating large area of osseous destruction involving calvarium  - CT chest 10/19/22 demonstrating RUL spiculated nodule measuring 1.8 cm, no prior imaging for comparison     Interventional Pulmonary consulted for biopsy of the mass. Planned for monarch navigation assisted robotic bronchoscopy with biopsy today w/ EBUS. Follow up path results. Follow up post op xray. Incentive spirometry.    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  A biopsy of a scalp lesion several weeks ago revealed metastatic adenocarcinoma.  Further work up with MRI PET was concerning for primary lung cancer.      #Adenocarcinoma of unclear origin  - per outpatient records patient underwent scalp biopsy 8/31/22  which demonstrated metastatic adenocarcinoma with mucinous differentiation, with phenotypic studies supportive of upper GI origin  - CT head 10/26/22 demonstrating large area of osseous destruction involving calvarium  - CT chest 10/19/22 demonstrating RUL spiculated nodule measuring 1.8 cm, no prior imaging for comparison     Interventional Pulmonary consulted for biopsy of the mass. Planned for monarch navigation assisted robotic bronchoscopy with biopsy today w/ EBUS. Follow up path results. Follow up post op xray. Incentive spirometry. okay to restart diet. okay to restart DVT ppx, tonight. daughter updated    Gildardo Amaya MD  [facility] PGY4  [facility] [address], [facility] [phone]

## 2022-11-01 NOTE — DISCHARGE NOTE NURSING/CASE MANAGEMENT/SOCIAL WORK - HAS THE PATIENT USED TOBACCO IN THE PAST 30 DAYS?
Case Management Discharge Note      Final Note: Home with eliquis and ryne mcdowell. gerardo arvizu/[facility]p         Selected Continued Care - Discharged on 9/17/2020 Admission date: 9/15/2020 - Discharge disposition: Home or Self Care    Destination    No services have been selected for the patient.              Durable Medical Equipment    No services have been selected for the patient.              Dialysis/Infusion    No services have been selected for the patient.              Home Medical Care    No services have been selected for the patient.              Therapy    No services have been selected for the patient.              Community Resources    No services have been selected for the patient.                  Transportation Services  Private: Car    Final Discharge Disposition Code: 01 - home or self-care   Yes

## 2022-11-01 NOTE — BRIEF OPERATIVE NOTE - OPERATION/FINDINGS
nelson robot assisted Supa robotic assisted wan bronchoscopy guided TBNA/TBBX of RUL. BAL of the RUL. Therapeutic aspiration of secretions.     #[id number]

## 2022-11-01 NOTE — PROGRESS NOTE ADULT - REASON FOR ADMISSION
Metastatic lung cancer

## 2022-11-01 NOTE — PROGRESS NOTE ADULT - SUBJECTIVE AND OBJECTIVE BOX
**************************************  Sarah Polk, PGY-1  After 7PM, please contact night float at #[phone] or [phone]  **************************************    INTERVAL HPI/OVERNIGHT EVENTS:  Patient was seen and examined at bedside. As per nurse and patient, no o/n events, patient resting comfortably. No complaints at this time. Patient denies: fever, chills, dizziness, weakness, HA, Changes in vision, CP, palpitations, SOB, cough, N/V/D/C, dysuria, changes in bowel movements, LE edema. ROS otherwise negative.    VITAL SIGNS:  T(F): 98 (11-01-22 @ 05:50)  HR: 63 (11-01-22 @ 05:50)  BP: 121/50 (11-01-22 @ 05:50)  RR: 17 (11-01-22 @ 05:50)  SpO2: 100% (11-01-22 @ 05:50)  Wt(kg): --    PHYSICAL EXAM:    GENERAL: NAD, lying in bed comfortably  HEAD:  Atraumatic, normocephalic  EYES: EOMI, PERRLA, conjunctiva and sclera clear  ENT: Moist mucous membranes  NECK: Supple, no JVD  HEART: Regular rate and rhythm, no murmurs, rubs, or gallops  LUNGS: Unlabored respirations.  Clear to auscultation bilaterally, no crackles, wheezing, or rhonchi  ABDOMEN: Soft, nontender, nondistended, +BS  EXTREMITIES: 2+ peripheral pulses bilaterally. No clubbing, cyanosis, or edema  NERVOUS SYSTEM:  A&Ox3, no focal deficits   SKIN: 2x2cm indurated hard lesion on the right parietal scalp, with crusting and flaking. No discharge, no oozing            MEDICATIONS  (STANDING):  chlorhexidine 2% Cloths 1 Application(s) Topical once  influenza  Vaccine (HIGH DOSE) 0.7 milliLiter(s) IntraMuscular once  levETIRAcetam 500 milliGRAM(s) Oral two times a day  mupirocin 2% Ointment 1 Application(s) Topical two times a day  polyethylene glycol 3350 17 Gram(s) Oral two times a day  senna 2 Tablet(s) Oral at bedtime    MEDICATIONS  (PRN):  acetaminophen     Tablet .. 650 milliGRAM(s) Oral every 6 hours PRN Mild Pain (1 - 3)  ALPRAZolam 0.5 milliGRAM(s) Oral every 8 hours PRN anxiety  bisacodyl Suppository 10 milliGRAM(s) Rectal once PRN Constipation  melatonin 3 milliGRAM(s) Oral at bedtime PRN Insomnia  morphine  - Injectable 2 milliGRAM(s) IV Push every 24 hours PRN prior to RT  oxyCODONE    IR 2.5 milliGRAM(s) Oral every 6 hours PRN Moderate Pain (4 - 6)      Allergies    No Known Allergies    Intolerances        LABS:                        10.3   9.51  )-----------( 264      ( 01 Nov 2022 05:00 )             31.6     11-01    136  |  101  |  28<H>  ----------------------------<  94  4.0   |  23  |  1.12    Ca    9.8      01 Nov 2022 05:00  Phos  3.6     11-01  Mg     2.20     11-01      PT/INR - ( 01 Nov 2022 05:00 )   PT: 11.4 sec;   INR: 0.98 ratio         PTT - ( 01 Nov 2022 05:00 )  PTT:27.6 sec      RADIOLOGY & ADDITIONAL TESTS:  Reviewed

## 2022-11-01 NOTE — PROGRESS NOTE ADULT - SUBJECTIVE AND OBJECTIVE BOX
CHIEF COMPLAINT:Patient is a 82y old  Female who presents with a chief complaint of Metastatic lung cancer (01 Nov 2022 06:33)      Interval Events:    REVIEW OF SYSTEMS:  [x] All other systems negative except per HPI   [ ] Unable to assess ROS because ________    OBJECTIVE:  ICU Vital Signs Last 24 Hrs  T(C): 36.5 (01 Nov 2022 07:42), Max: 36.8 (31 Oct 2022 22:08)  T(F): 97.7 (01 Nov 2022 07:42), Max: 98.2 (31 Oct 2022 22:08)  HR: 77 (01 Nov 2022 07:42) (63 - 84)  BP: 145/58 (01 Nov 2022 07:42) (121/50 - 145/58)  BP(mean): --  ABP: --  ABP(mean): --  RR: 18 (01 Nov 2022 07:42) (17 - 18)  SpO2: 99% (01 Nov 2022 07:42) (97% - 100%)    O2 Parameters below as of 01 Nov 2022 07:42  Patient On (Oxygen Delivery Method): room air                PHYSICAL EXAM:  GENERAL: NAD, well-groomed, well-developed  HEAD:  Atraumatic, Normocephalic  EYES: EOMI, PERRLA, conjunctiva and sclera clear  ENMT: No tonsillar erythema, exudates, or enlargement; Moist mucous membranes, Good dentition, No lesions  NECK: Supple, No JVD, Normal thyroid  CHEST/LUNG: Clear to auscultation bilaterally; No rales, rhonchi, wheezing, or rubs  HEART: Regular rate and rhythm; No murmurs, rubs, or gallops  ABDOMEN: Soft, Nontender, Nondistended; Bowel sounds present  VASCULAR:  2+ Peripheral Pulses, No clubbing, cyanosis, or edema  LYMPH: No lymphadenopathy noted  SKIN: No rashes or lesions  NERVOUS SYSTEM:  Alert & Oriented X3, Good concentration; Motor Strength 5/5 B/L upper and lower extremities; DTRs 2+ intact and symmetric    HOSPITAL MEDICATIONS:  MEDICATIONS  (STANDING):  chlorhexidine 2% Cloths 1 Application(s) Topical once  influenza  Vaccine (HIGH DOSE) 0.7 milliLiter(s) IntraMuscular once  levETIRAcetam 500 milliGRAM(s) Oral two times a day  mupirocin 2% Ointment 1 Application(s) Topical two times a day  polyethylene glycol 3350 17 Gram(s) Oral two times a day  senna 2 Tablet(s) Oral at bedtime    MEDICATIONS  (PRN):  acetaminophen     Tablet .. 650 milliGRAM(s) Oral every 6 hours PRN Mild Pain (1 - 3)  ALPRAZolam 0.5 milliGRAM(s) Oral every 8 hours PRN anxiety  bisacodyl Suppository 10 milliGRAM(s) Rectal once PRN Constipation  melatonin 3 milliGRAM(s) Oral at bedtime PRN Insomnia  morphine  - Injectable 2 milliGRAM(s) IV Push every 24 hours PRN prior to RT  oxyCODONE    IR 2.5 milliGRAM(s) Oral every 6 hours PRN Moderate Pain (4 - 6)      LABS:    The Labs were reviewed by me   The Radiology was reviewed by me    EKG tracing reviewed by me    11-01    136  |  101  |  28<H>  ----------------------------<  94  4.0   |  23  |  1.12  10-31    136  |  102  |  37<H>  ----------------------------<  92  4.5   |  24  |  1.29  10-30    136  |  100  |  28<H>  ----------------------------<  96  4.1   |  24  |  1.31<H>    Ca    9.8      01 Nov 2022 05:00  Ca    9.8      31 Oct 2022 06:40  Ca    9.9      30 Oct 2022 06:05  Phos  3.6     11-01  Mg     2.20     11-01      Magnesium, Serum: 2.20 mg/dL (11-01-22 @ 05:00)  Magnesium, Serum: 2.10 mg/dL (10-31-22 @ 06:40)  Magnesium, Serum: 2.10 mg/dL (10-30-22 @ 06:05)    Phosphorus Level, Serum: 3.6 mg/dL (11-01-22 @ 05:00)  Phosphorus Level, Serum: 4.4 mg/dL (10-31-22 @ 06:40)  Phosphorus Level, Serum: 4.3 mg/dL (10-30-22 @ 06:05)      PT/INR - ( 01 Nov 2022 05:00 )   PT: 11.4 sec;   INR: 0.98 ratio         PTT - ( 01 Nov 2022 05:00 )  PTT:27.6 sec                                        10.3   9.51  )-----------( 264      ( 01 Nov 2022 05:00 )             31.6                         10.8   10.31 )-----------( 254      ( 31 Oct 2022 06:40 )             32.8                         10.4   8.39  )-----------( 250      ( 30 Oct 2022 06:05 )             31.1     CAPILLARY BLOOD GLUCOSE            MICROBIOLOGY:     RADIOLOGY:  [ ] Reviewed and interpreted by me    Point of Care Ultrasound Findings:    PFT:    EKG: CHIEF COMPLAINT:Patient is a 82y old  Female who presents with a chief complaint of Metastatic lung cancer (01 Nov 2022 06:33)      Interval Events:    REVIEW OF SYSTEMS:  [x] All other systems negative except per HPI   [ ] Unable to assess ROS because ________    OBJECTIVE:  ICU Vital Signs Last 24 Hrs  T(C): 36.5 (01 Nov 2022 07:42), Max: 36.8 (31 Oct 2022 22:08)  T(F): 97.7 (01 Nov 2022 07:42), Max: 98.2 (31 Oct 2022 22:08)  HR: 77 (01 Nov 2022 07:42) (63 - 84)  BP: 145/58 (01 Nov 2022 07:42) (121/50 - 145/58)  RR: 18 (01 Nov 2022 07:42) (17 - 18)  SpO2: 99% (01 Nov 2022 07:42) (97% - 100%)    O2 Parameters below as of 01 Nov 2022 07:42  Patient On (Oxygen Delivery Method): room air                PHYSICAL EXAM:  GENERAL: NAD, well-groomed, well-developed  HEAD:  Atraumatic, Normocephalic  EYES: EOMI, PERRLA, conjunctiva and sclera clear  ENMT: No tonsillar erythema, exudates, or enlargement; Moist mucous membranes, Good dentition, No lesions  NECK: Supple, No JVD, Normal thyroid  CHEST/LUNG: Clear to auscultation bilaterally; No rales, rhonchi, wheezing, or rubs  HEART: Regular rate and rhythm; No murmurs, rubs, or gallops  ABDOMEN: Soft, Nontender, Nondistended; Bowel sounds present  VASCULAR:  2+ Peripheral Pulses, No clubbing, cyanosis, or edema  LYMPH: No lymphadenopathy noted  SKIN: No rashes or lesions  NERVOUS SYSTEM:  Alert & Oriented X3, Good concentration; Motor Strength 5/5 B/L upper and lower extremities; DTRs 2+ intact and symmetric    HOSPITAL MEDICATIONS:  MEDICATIONS  (STANDING):  chlorhexidine 2% Cloths 1 Application(s) Topical once  influenza  Vaccine (HIGH DOSE) 0.7 milliLiter(s) IntraMuscular once  levETIRAcetam 500 milliGRAM(s) Oral two times a day  mupirocin 2% Ointment 1 Application(s) Topical two times a day  polyethylene glycol 3350 17 Gram(s) Oral two times a day  senna 2 Tablet(s) Oral at bedtime    MEDICATIONS  (PRN):  acetaminophen     Tablet .. 650 milliGRAM(s) Oral every 6 hours PRN Mild Pain (1 - 3)  ALPRAZolam 0.5 milliGRAM(s) Oral every 8 hours PRN anxiety  bisacodyl Suppository 10 milliGRAM(s) Rectal once PRN Constipation  melatonin 3 milliGRAM(s) Oral at bedtime PRN Insomnia  morphine  - Injectable 2 milliGRAM(s) IV Push every 24 hours PRN prior to RT  oxyCODONE    IR 2.5 milliGRAM(s) Oral every 6 hours PRN Moderate Pain (4 - 6)      LABS:    The Labs were reviewed by me   The Radiology was reviewed by me    EKG tracing reviewed by me    11-01    136  |  101  |  28<H>  ----------------------------<  94  4.0   |  23  |  1.12  10-31    136  |  102  |  37<H>  ----------------------------<  92  4.5   |  24  |  1.29  10-30    136  |  100  |  28<H>  ----------------------------<  96  4.1   |  24  |  1.31<H>    Ca    9.8      01 Nov 2022 05:00  Ca    9.8      31 Oct 2022 06:40  Ca    9.9      30 Oct 2022 06:05  Phos  3.6     11-01  Mg     2.20     11-01      Magnesium, Serum: 2.20 mg/dL (11-01-22 @ 05:00)  Magnesium, Serum: 2.10 mg/dL (10-31-22 @ 06:40)  Magnesium, Serum: 2.10 mg/dL (10-30-22 @ 06:05)    Phosphorus Level, Serum: 3.6 mg/dL (11-01-22 @ 05:00)  Phosphorus Level, Serum: 4.4 mg/dL (10-31-22 @ 06:40)  Phosphorus Level, Serum: 4.3 mg/dL (10-30-22 @ 06:05)      PT/INR - ( 01 Nov 2022 05:00 )   PT: 11.4 sec;   INR: 0.98 ratio         PTT - ( 01 Nov 2022 05:00 )  PTT:27.6 sec                                        10.3   9.51  )-----------( 264      ( 01 Nov 2022 05:00 )             31.6                         10.8   10.31 )-----------( 254      ( 31 Oct 2022 06:40 )             32.8                         10.4   8.39  )-----------( 250      ( 30 Oct 2022 06:05 )             31.1     CAPILLARY BLOOD GLUCOSE            MICROBIOLOGY:     RADIOLOGY:  [X] Reviewed and interpreted by me - CT Chest with RUL lung nodule concerning for malignancy.     Point of Care Ultrasound Findings:    PFT:    EKG: CHIEF COMPLAINT:Patient is a 82y old  Female who presents with a chief complaint of Metastatic lung cancer (01 Nov 2022 06:33)      Interval Events: no cough post procedure, some sore throat, breathing without issues, Patient denies fevers, chills, chest pain, shortness of breath, nausea, abdominal pain, diarrhea, constipation, dysuria, leg swelling, headache, light headedness    REVIEW OF SYSTEMS:  [x] All other systems negative except per HPI   [ ] Unable to assess ROS because ________    OBJECTIVE:  ICU Vital Signs Last 24 Hrs  T(C): 36.5 (01 Nov 2022 07:42), Max: 36.8 (31 Oct 2022 22:08)  T(F): 97.7 (01 Nov 2022 07:42), Max: 98.2 (31 Oct 2022 22:08)  HR: 77 (01 Nov 2022 07:42) (63 - 84)  BP: 145/58 (01 Nov 2022 07:42) (121/50 - 145/58)  RR: 18 (01 Nov 2022 07:42) (17 - 18)  SpO2: 99% (01 Nov 2022 07:42) (97% - 100%)    O2 Parameters below as of 01 Nov 2022 07:42  Patient On (Oxygen Delivery Method): room air                PHYSICAL EXAM:  GENERAL: NAD, well-groomed, well-developed  HEAD:  Atraumatic, Normocephalic  EYES: EOMI, PERRLA, conjunctiva and sclera clear  ENMT: No tonsillar erythema, exudates, or enlargement; Moist mucous membranes, Good dentition, No lesions  NECK: Supple, No JVD, Normal thyroid  CHEST/LUNG: Clear to auscultation bilaterally; No rales, rhonchi, wheezing, or rubs  HEART: Regular rate and rhythm; No murmurs, rubs, or gallops  ABDOMEN: Soft, Nontender, Nondistended; Bowel sounds present  VASCULAR:  2+ Peripheral Pulses, No clubbing, cyanosis, or edema  LYMPH: No lymphadenopathy noted  SKIN: No rashes or lesions  NERVOUS SYSTEM:  Alert & Oriented X3, Good concentration; scalp lesion    HOSPITAL MEDICATIONS:  MEDICATIONS  (STANDING):  chlorhexidine 2% Cloths 1 Application(s) Topical once  influenza  Vaccine (HIGH DOSE) 0.7 milliLiter(s) IntraMuscular once  levETIRAcetam 500 milliGRAM(s) Oral two times a day  mupirocin 2% Ointment 1 Application(s) Topical two times a day  polyethylene glycol 3350 17 Gram(s) Oral two times a day  senna 2 Tablet(s) Oral at bedtime    MEDICATIONS  (PRN):  acetaminophen     Tablet .. 650 milliGRAM(s) Oral every 6 hours PRN Mild Pain (1 - 3)  ALPRAZolam 0.5 milliGRAM(s) Oral every 8 hours PRN anxiety  bisacodyl Suppository 10 milliGRAM(s) Rectal once PRN Constipation  melatonin 3 milliGRAM(s) Oral at bedtime PRN Insomnia  morphine  - Injectable 2 milliGRAM(s) IV Push every 24 hours PRN prior to RT  oxyCODONE    IR 2.5 milliGRAM(s) Oral every 6 hours PRN Moderate Pain (4 - 6)      LABS:    The Labs were reviewed by me   The Radiology was reviewed by me    EKG tracing reviewed by me    11-01    136  |  101  |  28<H>  ----------------------------<  94  4.0   |  23  |  1.12  10-31    136  |  102  |  37<H>  ----------------------------<  92  4.5   |  24  |  1.29  10-30    136  |  100  |  28<H>  ----------------------------<  96  4.1   |  24  |  1.31<H>    Ca    9.8      01 Nov 2022 05:00  Ca    9.8      31 Oct 2022 06:40  Ca    9.9      30 Oct 2022 06:05  Phos  3.6     11-01  Mg     2.20     11-01      Magnesium, Serum: 2.20 mg/dL (11-01-22 @ 05:00)  Magnesium, Serum: 2.10 mg/dL (10-31-22 @ 06:40)  Magnesium, Serum: 2.10 mg/dL (10-30-22 @ 06:05)    Phosphorus Level, Serum: 3.6 mg/dL (11-01-22 @ 05:00)  Phosphorus Level, Serum: 4.4 mg/dL (10-31-22 @ 06:40)  Phosphorus Level, Serum: 4.3 mg/dL (10-30-22 @ 06:05)      PT/INR - ( 01 Nov 2022 05:00 )   PT: 11.4 sec;   INR: 0.98 ratio         PTT - ( 01 Nov 2022 05:00 )  PTT:27.6 sec                                        10.3   9.51  )-----------( 264      ( 01 Nov 2022 05:00 )             31.6                         10.8   10.31 )-----------( 254      ( 31 Oct 2022 06:40 )             32.8                         10.4   8.39  )-----------( 250      ( 30 Oct 2022 06:05 )             31.1     CAPILLARY BLOOD GLUCOSE            MICROBIOLOGY:     RADIOLOGY:  [X] Reviewed and interpreted by me - CT Chest with RUL lung nodule concerning for malignancy.     Point of Care Ultrasound Findings:    PFT:    EKG:

## 2022-11-01 NOTE — PROGRESS NOTE ADULT - PROVIDER SPECIALTY LIST ADULT
Intervent Pulmonology
Pulmonology
Heme/Onc
Heme/Onc
Internal Medicine
Rad Onc
Heme/Onc
Internal Medicine

## 2022-11-08 NOTE — VITALS
[Maximal Pain Intensity: 5/10] : 5/10 [Least Pain Intensity: 0/10] : 0/10 [Pain Description/Quality: ___] : Pain description/quality: [unfilled] [Pain Duration: ___] : Pain duration: [unfilled] [Pain Location: ___] : Pain Location: [unfilled] [Pain Interferes with ADLs] : Pain interferes with activities of daily living. [NSAID/Non-Opioid] : NSAID/Non-Opioid [80: Normal activity with effort; some signs or symptoms of disease.] : 80: Normal activity with effort; some signs or symptoms of disease.  [ECOG Performance Status: 1 - Restricted in physically strenuous activity but ambulatory and able to carry out work of a light or sedentary nature] : Performance Status: 1 - Restricted in physically strenuous activity but ambulatory and able to carry out work of a light or sedentary nature, e.g., light house work, office work [10 - Extreme Distress] : Distress Level: 10 [Referred Patient  to social work for follow-up] : Patient was referred to social work for follow-up

## 2022-11-14 PROBLEM — C79.9 METASTATIC CANCER: Status: ACTIVE | Noted: 2022-01-01

## 2022-11-14 PROBLEM — F41.9 ANXIETY: Status: ACTIVE | Noted: 2019-05-22

## 2022-11-14 PROBLEM — Z29.8 SEIZURE PROPHYLAXIS: Status: ACTIVE | Noted: 2022-01-01

## 2022-11-14 NOTE — HISTORY OF PRESENT ILLNESS
[Post-hospitalization from ___ Hospital] : Post-hospitalization from [unfilled] Hospital [Admitted on: ___] : The patient was admitted on [unfilled] [Discharged on ___] : discharged on [unfilled] [Discharge Summary] : discharge summary [FreeTextEntry2] : seen with family member\par hosp records reviewed\par to see onc as sched\par has not had definitive tx.for met lung cancer yet\par ongoing anxiety\par no pain\par has advanced directives\par wt loss noted\par seeing RAD ONC tomm\par on Keppra for seizure prophylaxis\par

## 2022-11-14 NOTE — PHYSICAL EXAM
[Normal] : soft, non-tender, non-distended, no masses palpated, no HSM and normal bowel sounds [de-identified] : skin lesion on scalp

## 2022-11-14 NOTE — ASSESSMENT
[FreeTextEntry1] : to establish care with onc\par discussed management of anxiety including initiation of standing mood stabilizer\par pt defers at this time\par she states on 1/2 xanax bid her anxiety is manageable\par discussed risk/benefit of ongoing benzo tx including tolerance , falls, memory impairment--pt and family member understand\par Ensure/boost/calorie maintenance encouraged

## 2022-11-16 NOTE — HISTORY OF PRESENT ILLNESS
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 S/p scalp and lung biopsy. \par \par 8/31/2022 Scalp biopsy done. \par \par 9/29/2022 Slide consult: Final Diagnosis\par SLIDE CONSULT: Scalp, biopsy (1 H T E; 4 IHC: [id number])\par - Cutaneous involvement of adenocarcinoma with muconous features, see\par note\par \par Presents today to discuss treatment with radiation.\par

## 2022-11-16 NOTE — REVIEW OF SYSTEMS
[Fatigue] : fatigue [Muscle Pain] : muscle pain [Skin Wound] : skin wound [Anxiety] : anxiety [Depression] : depression [Negative] : Allergic/Immunologic [Fever] : no fever [Chills] : no chills [Night Sweats] : no night sweats [Recent Change In Weight] : ~T no recent weight change [Joint Pain] : no joint pain [Muscle Weakness] : no muscle weakness [Gait Disturbance] : no gait disturbance [Skin Rash] : no skin rash [Suicidal] : not suicidal [Insomnia] : no insomnia

## 2022-12-02 NOTE — ED PROVIDER NOTE - OBJECTIVE STATEMENT
82 year old female with PMH metastatic lung cancer with mets to the bone and skull presents with nausea and multiple episodes of nonbloody nonbilious emesis today. Per daughter at bedside patient has experienced fatigue, poor PO intake, dehydration, and malaise for the past 2 weeks with worsening symptoms today. Patient denies headache, shortness of breath, abdominal pain, chest pain. Had blood work at oncology on Wednesday which was suspicion for UTI and dehydration, does not know results otherwise.

## 2022-12-02 NOTE — ED PROVIDER NOTE - PHYSICAL EXAMINATION
PHYSICAL EXAM:  CONSTITUTIONAL: Fatigued and thin appearing, awake, alert, oriented to person, place, time/situation and in no apparent distress.  HEAD: Atraumatic  EYES: Clear bilaterally, pupils equal, round and reactive to light.  ENMT: Airway patent, Nasal mucosa clear. Mouth with normal mucosa. Uvula is midline.   CARDIAC: Normal rate, regular rhythm. +S1/S2. No murmurs, rubs or gallops.  RESPIRATORY: Breathing unlabored. Breath sounds clear and equal bilaterally.  ABDOMEN:  LLQ and RLQ tenderness to palpation. Abdomen soft, nondistended. No rebound tenderness or guarding.  NEUROLOGICAL: Alert and oriented, no focal deficits, no motor or sensory deficits.   MSK: No clubbing, cyanosis, or edema. Full range of motion of all extremities.   SKIN: Skin warm and dry. No evidence of rashes or lesions.

## 2022-12-02 NOTE — ED ADULT NURSE NOTE - OBJECTIVE STATEMENT
Received pt into spot #[id number] via wheelchair, accompanied by daughter who is a nurse. Pt needed 2 assist to transfer to stretcher. Pt appears flat affect. But answering questions appropriately. C/o generalized weakness with nausea/vomiting , decreased appetite and decreased drinking of fluids. Oral mucosa appears dry. Pt denies any dizziness, pain, CP, palpitations or SOB. Pt placed on tele monitor SR HR 80s. Skin dry and intact to sacrum. Pt recently dx approx 1-2 months ago with Stage 4 lung cancer with mets to scalp. Large tumor noted to right occipital area radiating across left side of scalp and into left ear. Pt's BP elevated at 197/81. As per daughter pt has HTN where pt does not take BP meds. Dtr states pt's BP always elevated with pt being agitated. Pt awaiting eval by MD.

## 2022-12-02 NOTE — ED PROVIDER NOTE - NS ED ROS FT
ROS:  -Constitutional: Denies fever  -Head: Denies headache  -Eyes: Denies blurry vision  -Cardiovascular: Denies chest pain  -Pulmonary: Denies shortness of breath  -Gastrointestinal: +nausea + vomiting  -Genitourinary: Denies dysuria  -Skin: Denies new rashes  -Neuro: Denies numbness or tingling

## 2022-12-02 NOTE — ED PROVIDER NOTE - ATTENDING CONTRIBUTION TO CARE
Yoshi Lowe DO:  patient seen and evaluated with the resident.  I was present for key portions of the History & Physical, and I agree with the Impression & Plan. 81 yo f pmh metastatic lung ca w/ mets to bone and skull, pw n/v. Presents with daughter bedside who is caretaker and provides collateral.  Patient has had an/V, nonbloody nonbilious, since after Thanksgiving.  Patient has had reduced p.o. intake, fatigue, dehydration and malaise since then.  Patient had worsening symptoms today.  Patient denies headache, abdominal pain.  Patient denies CP, SOB, rash.  Patient had blood work at an Ortho facility on Wednesday, does not know results, high suspicion for UTI.  Patient arrives HDS, dry appearing.  Maintaining airway.  Differential broad upon arrival.  Will eval for occult infection, electrolyte abnormality, UTI, will obtain imaging of CT head and abdomen.  Reassess. Yoshi Lowe DO:  patient seen and evaluated with the resident.  I was present for key portions of the History & Physical, and I agree with the Impression & Plan. 81 yo f pmh metastatic lung ca w/ mets to bone and skull, pw n/v. Presents with daughter bedside who is caretaker and provides collateral.  Patient has had an/V, nonbloody nonbilious, since after Thanksgiving.  Patient has had reduced p.o. intake, fatigue, dehydration and malaise since then.  Patient had worsening symptoms today.  Patient denies headache, abdominal pain.  Patient denies CP, SOB, rash.  Patient had blood work at an Ortho facility on Wednesday, does not know results, high suspicion for UTI.  Patient arrives HDS, dry appearing.  Maintaining airway.  Differential broad upon arrival.  Will eval for occult infection, electrolyte abnormality, UTI, will obtain imaging of CT head and abdomen.  Reassess..

## 2022-12-02 NOTE — ED PROVIDER NOTE - CLINICAL SUMMARY MEDICAL DECISION MAKING FREE TEXT BOX
Chelsie Moore DO PGY-2  82 year old female with PMH metastatic lung cancer with mets to the bone and skull presents with nausea and multiple episodes of nonbloody nonbilious emesis today. Per daughter at bedside patient has experienced fatigue, poor PO intake, dehydration, and malaise for the past 2 weeks with worsening symptoms today. Concern for UTI, intraabdominal pathology including obstruction, progression of disease, hematologic/electrolyte abnormalities. Will obtain CTs, labs, CXR, EKG, UA, and re-evaluate for dispo. Patient and daughter have strong preference for home.

## 2022-12-02 NOTE — ED PROVIDER NOTE - PATIENT PORTAL LINK FT
You can access the FollowMyHealth Patient Portal offered by [facility] by registering at the following website: [email or web address]. By joining [email or web address]’s FollowMyHealth portal, you will also be able to view your health information using other applications (apps) compatible with our system.

## 2022-12-02 NOTE — ED PROVIDER NOTE - NSFOLLOWUPINSTRUCTIONS_ED_ALL_ED_FT
You were seen in the ER for your nausea, vomiting, and dehydration.    While you were here you had blood work, CT scans, and urine testing done. The results were discussed with you.    For nausea/vomiting, you can take the zofran oral tablet up to every 8 hours.  Continue all regular home medications as prescribed.    Be sure to drink lots of fluids.    Follow up with your primary doctor/oncologist within 3 days.  You were given copies of the labs and imaging results, please take them to your follow up appointments.    Return to the ER for any worsening symptoms or concerns including inability to tolerate food or water, chest pain, trouble breathing, weakness, or any other concerns.

## 2022-12-02 NOTE — ED PROVIDER NOTE - PROGRESS NOTE DETAILS
Chelsie Moore DO PGY-2  Patient passed PO challenge. Will dc home with oncologist follow up and zofran prescription.    I discussed the plan for discharge home with the patient. Instructed the patient to return to the emergency department with any alarming symptoms, any worsening symptoms, or any other concerning symptoms.  I instructed this patient to call their primary doctor today, to inform them of their visit to the emergency department, and to obtain a repeat evaluation from their primary doctor in the next 1-3 days. The patient understands and agrees with this plan for follow up and feels safe returning home.  At the time of discharge this patient remained in stable condition, remained in no acute distress, and their vital signs remained stable measured within normal limits. Chelsie Moore, DO PGY-2  Patient re-evaluated, feels comfortable. Will attempt PO challenge.  Discussed results with patient and daughter at bedside, they will follow up head CT with outpatient MRI, will discuss with oncologist.

## 2022-12-02 NOTE — ED ADULT NURSE NOTE - CHIEF COMPLAINT QUOTE
Yes
Pt c/o weakness, fatigue and dehydration. endorsing nausea and vomiting. PMHx: stage 4 lung cancer.

## 2022-12-02 NOTE — ED ADULT NURSE REASSESSMENT NOTE - NS ED NURSE REASSESS COMMENT FT1
Pt had 1 episode of vomiting. MD Moore made aware and stated to give Zofran 4mg. Administered as ordered.
Pt verbalzies relief from Zofran. Denies CP, SOB, nausea, lightheadedness, dizziness, fever or chills. Resp even and unlabored. No apparent distress noted. Pt appears comfortably laying in bed HOB elevated. Family at bedside. NSR on bedside cardiac monitor. Bed in lowest position, call bell in reach, wheels locked, side rails up, safety maintained. Awaiting further orders.
Report received from day RNJose. Pt c/o lower abdominal pain. Daughter at bedside states pt reports 10/10 pain level. Ofirmev administered as ordered. Resp even and unlabored. Pt appears shaking, baseline as per daughter at bedside. Pt nonverbal at this time, however alert with eyes open. Pt nods as response to RN. NSR on bedside cardiac monitor. MD resident Moore made aware of pt's pain level. Bed in lowest position, call bell in reach, wheels locked, side rails up, safety maintained. Awaiting urine, CT and xray.
Pt verbalizes improvement in abdominal pain level. Reports 1/10 pain level at this time. Resp even and unlabored. Pt speaking in full and complete sentences at this time. Denies CP, SOB, nausea, vomiting, headache, lightheadedness, dizziness, fever or chills. NSR on bedside cardiac monitor. Family at bedside. No apparent distress noted, pt appears comfortably laying with HOB elevated. Bed in lowest position, call bell in reach, wheels locked, side rails up, safety maintained. Awaiting CT.

## 2022-12-05 PROBLEM — C80.1 ADENOCARCINOMA OF UNKNOWN PRIMARY: Status: ACTIVE | Noted: 2022-01-01

## 2022-12-05 NOTE — HISTORY OF PRESENT ILLNESS
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 Saw dermatology,  Bx revealed adenocarcinoma, origin unknown, she saw Dr Cotto at [facility].\par \par Her MRI of brain shows mass lesion on brain from 13 cm mass in calvarium with no brain lesions and no edema. Pt has been having headaches for several months. Her PETCT  shows mass in lung and multiple adenopathy consistent with lung primary. Her initial biopsy is from outside is small specimen and pt will need more tissue. \par \par She was admitted for further work up and evaluation. I met her at [facility]. \par \par - PET Scan 10/13 Large FDG-avid destructive lesion in right parietal calvarium with an associated soft tissue mass which extends to the scalp and intracranially.\par - Underlying mets to the calvarium\par - MRI 10/20 showing Marked expansion and abnormal signal within the entire right posterior parietal and occipital calvarium crossing the midline to include the left occipital calvarium, measuring at least 13 cm in length by 2 cm in width (largest dimension), compatible with known large osseous metastasis. Abnormal signal along the entire dura underlying the lesion, most suggestive of dural infiltration. No invasion of the brain parenchyma. Moderate mass effect upon the right posterior parietal lobe, right occipital lobe, and right posterior temporal lobe secondary to the bony metastasis. There is a dominant overlying soft tissue component measuring approximately 3.6 x 1.4 cm. Several additional scattered subcentimeter foci of enhancement within the rest of the calvarium, suggestive of additional very small osseous metastases.\par \par She was seen by neurology and started on AED. \par Seen by rad onc and rt to the scalp lesion was recommended. She was not able to tolerate RT to scalp as inpatient and requested to have it done as outaptient. \par He had a bronch and biopsy of the mass as inpatient. \par \par She is presenting with her daughter Amy to discuss options. \par She has been smoking most of her life, until recently. She still sometimes smokes. \par No EtOH, drugs. \par Lives alone. family are around. \par Daughter lives out of state, she is a nurse.\par \par

## 2022-12-05 NOTE — HISTORY OF PRESENT ILLNESS
[Home] : at home, [unfilled] , at the time of the visit. 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 Saw dermatology,  Bx revealed adenocarcinoma, origin unknown, [place] [facility] \par Her MRI of brain shows mass lesion on brain from 13 cm mass in calvarium with no brain lesions and no edema. Pt has been having headaches for several months. Her PETCT  shows mass in lung and multiple adenopathy consistent with lung primary. Her initial biopsy is from outside is small specimen and pt will need more tissue. \par \par She was admitted for further work up and evaluation. I met her at [facility]. \par \par - PET Scan 10/13 Large FDG-avid destructive lesion in right parietal calvarium with an associated soft tissue mass which extends to the scalp and intracranially.\par - Underlying mets to the calvarium\par - MRI 10/20 showing Marked expansion and abnormal signal within the entire right posterior parietal and occipital calvarium crossing the midline to include the left occipital calvarium, measuring at least 13 cm in length by 2 cm in width (largest dimension), compatible with known large osseous metastasis. Abnormal signal along the entire dura underlying the lesion, most suggestive of dural infiltration. No invasion of the brain parenchyma. Moderate mass effect upon the right posterior parietal lobe, right occipital lobe, and right posterior temporal lobe secondary to the bony metastasis. There is a dominant overlying soft tissue component measuring approximately 3.6 x 1.4 cm. Several additional scattered subcentimeter foci of enhancement within the rest of the calvarium, suggestive of additional very small osseous metastases.\par \par She was seen by neurology and started on AED. \par Seen by rad onc and rt to the scalp lesion was recommended. She was not able to tolerate RT to scalp as inpatient and requested to have it done as outaptient. \par He had a bronch and biopsy of the mass as inpatient. \par \par Foundation without actionable mutations, KRAS G12D and MDM2 amplification \par PDL1 1%\par  [de-identified] : Pt has seen rad onc and is planned for treatment\par She has been feeling very weak, not eating and drinking as much and spends most of her time in bed\par Discussed doing blood work and UA to evaluate her worsening weakness. \par Will have labs done tomorrow, will check back with pt and daughter thursday to go over treatment options.

## 2022-12-05 NOTE — ASSESSMENT
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 \par She has been feeling very weak, not eating and drinking as much and spends most of her time in bed\par Discussed doing blood work and UA to evaluate her worsening weakness. \par \par Labs reviewed, dehydrated, uptrending Cr and BUN, Uptrending Ca\par \par -Discussed dehydration, daughter and pt report that she has been able to drink lots of fluids today and is feeling much better today. recommended to continue PO hydration and increased PO intake and recheck labs in 1 week. \par -Discussed results of the molecular tests with No actionable mutations and PDL1 1%, discussed standard of care treatment would be platinum chemotherapy back bone with immunotherapy, however single agent ICI can also be considered given PDL1 score of 1%. Another option is best supportive care. \par -All questions answered. Pt considering ICI single agent. Will think about it and get back to us about what she would like to do. \par -Labs in 1 week\par -In case of acutely worsening symptoms, present to the ED\par -Radiation oncology follow up\par \par \par Total time of this visit, including time spent face to face with patient and/or via video/audio, and also in preparing for today's visit for MDM and documentation (Medical Decision Making, including consideration of possible diagnoses, management options, complex medical record review, review of diagnostic tests and information, consideration and discussion of significant complications based on comorbidities, and discussion with providers involved with the care of the patient) 25 minutes.\par

## 2022-12-05 NOTE — ASSESSMENT
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 \par \par Disease course and work up to date was discussed in detail\par Test results reviewed with patient and her daughter in detail\par All questions answered\par \par -Radiation oncology follow up\par -Awaiting molecular studies, foundation pending\par -Follow PDL1\par -Guardant\par -Will discuss systemic treatment options once above test results are available\par -RTC after tests results are available\par \par Total time of this visit, including time spent face to face with patient and/or via video/audio, and also in preparing for today's visit for MDM and documentation (Medical Decision Making, including consideration of possible diagnoses, management options, complex medical record review, review of diagnostic tests and information, consideration and discussion of significant complications based on comorbidities, and discussion with providers involved with the care of the patient) 60 minutes.\par

## 2022-12-05 NOTE — HISTORY OF PRESENT ILLNESS
[Home] : at home, [unfilled] , at the time of the visit. 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 Saw dermatology,  Bx revealed adenocarcinoma, origin unknown, she saw Dr Cotto at [facility].\par \par Her MRI of brain shows mass lesion on brain from 13 cm mass in calvarium with no brain lesions and no edema. Pt has been having headaches for several months. Her PETCT  shows mass in lung and multiple adenopathy consistent with lung primary. Her initial biopsy is from outside is small specimen and pt will need more tissue. \par \par She was admitted for further work up and evaluation. I met her at [facility]. \par \par - PET Scan 10/13 Large FDG-avid destructive lesion in right parietal calvarium with an associated soft tissue mass which extends to the scalp and intracranially.\par - Underlying mets to the calvarium\par - MRI 10/20 showing Marked expansion and abnormal signal within the entire right posterior parietal and occipital calvarium crossing the midline to include the left occipital calvarium, measuring at least 13 cm in length by 2 cm in width (largest dimension), compatible with known large osseous metastasis. Abnormal signal along the entire dura underlying the lesion, most suggestive of dural infiltration. No invasion of the brain parenchyma. Moderate mass effect upon the right posterior parietal lobe, right occipital lobe, and right posterior temporal lobe secondary to the bony metastasis. There is a dominant overlying soft tissue component measuring approximately 3.6 x 1.4 cm. Several additional scattered subcentimeter foci of enhancement within the rest of the calvarium, suggestive of additional very small osseous metastases.\par \par She was seen by neurology and started on AED. \par Seen by rad onc and rt to the scalp lesion was recommended. She was not able to tolerate RT to scalp as inpatient and requested to have it done as outaptient. \par He had a bronch and biopsy of the mass as inpatient. \par \par Foundation without actionable mutations, KRAS G12D and MDM2 amplification \par PDL1 1%\par  [de-identified] : Pt has seen rad onc and is planned for treatment\par She has been feeling very weak, not eating and drinking as much and spends most of her time in bed\par Blood work reviewed. Appears dehydrated, Ca and BUN, Cr trending up.

## 2022-12-05 NOTE — ASSESSMENT
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 \par She has been feeling very weak, not eating and drinking as much and spends most of her time in bed\par Discussed doing blood work and UA to evaluate her worsening weakness. \par Will have labs done tomorrow, will check back with pt and daughter thursday to go over treatment options.\par \par -Labs ordered for tomorrow\par -In case of acutely worsening symptoms, present to the ED\par -Radiation oncology follow up\par -Molecular studies reviewed with patient's daughter\par -Will discuss systemic treatment options on Thursday\par -RTC after tests results are available\par \par Total time of this visit, including time spent face to face with patient and/or via video/audio, and also in preparing for today's visit for MDM and documentation (Medical Decision Making, including consideration of possible diagnoses, management options, complex medical record review, review of diagnostic tests and information, consideration and discussion of significant complications based on comorbidities, and discussion with providers involved with the care of the patient) 14 minutes.\par

## 2022-12-06 NOTE — REASON FOR VISIT
[Routine On-Treatment] : a routine on-treatment visit for [Bone Metastasis] : bone metastasis [Lung Cancer] : lung cancer

## 2022-12-07 NOTE — DISEASE MANAGEMENT
[Clinical] : TNM Stage: c [IV] : IV [TTNM] : x [NTNM] : x [MTNM] : 1 [de-identified] : 231 [de-identified] : 1876 [de-identified] : Skull

## 2022-12-07 NOTE — ED POST DISCHARGE NOTE - RESULT SUMMARY
UCX : Enterococcus faecalis > 100,000 No antibiotic listed in ED provider note or prescription writer at time of discharge. message left with Call Back  P.A. number and hours for return call back.

## 2022-12-07 NOTE — VITALS
[Maximal Pain Intensity: 10/10] : 10/10 [Least Pain Intensity: 0/10] : 0/10 [Pain Description/Quality: ___] : Pain description/quality: [unfilled] [Pain Duration: ___] : Pain duration: [unfilled] [Pain Location: ___] : Pain Location: [unfilled] [Pain Interferes with ADLs] : Pain interferes with activities of daily living. [NSAID/Non-Opioid] : NSAID/Non-Opioid [80: Normal activity with effort; some signs or symptoms of disease.] : 80: Normal activity with effort; some signs or symptoms of disease.  [ECOG Performance Status: 1 - Restricted in physically strenuous activity but ambulatory and able to carry out work of a light or sedentary nature] : Performance Status: 1 - Restricted in physically strenuous activity but ambulatory and able to carry out work of a light or sedentary nature, e.g., light house work, office work

## 2022-12-07 NOTE — HISTORY OF PRESENT ILLNESS
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 S/p scalp and lung biopsy. \par \par 8/31/2022 Scalp biopsy done. \par \par 9/29/2022 Slide consult: Final Diagnosis\par SLIDE CONSULT: Scalp, biopsy (1 H T E; 4 IHC: [id number])\par - Cutaneous involvement of adenocarcinoma with muconous features, see\par note\par \par 12/7/2022 Presents today for OTV. Completed fx 3/20 to skull lesion.  C/o severe headache to back of head. Percocet e-scribed.

## 2022-12-12 NOTE — DISEASE MANAGEMENT
[Clinical] : TNM Stage: c [TTNM] : x [NTNM] : x [MTNM] : 1 [IV] : IV [de-identified] : 6821 [de-identified] : 2288 [de-identified] : Skull

## 2022-12-12 NOTE — HISTORY OF PRESENT ILLNESS
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 S/p scalp and lung biopsy. \par \par 8/31/2022 Scalp biopsy done. \par \par 9/29/2022 Slide consult: Final Diagnosis\par SLIDE CONSULT: Scalp, biopsy (1 H T E; 4 IHC: [id number])\par - Cutaneous involvement of adenocarcinoma with muconous features, see\par note\par \par 12/7/2022 Presents today for OTV. Completed fx 3/20 to skull lesion.  C/o severe headache to back of head. Percocet e-scribed.\par \par 12/13/2022: 7/20 fractions completed. Ms. Milian presents today for OTV.

## 2024-06-21 NOTE — ED ADULT TRIAGE NOTE - MODE OF ARRIVAL
Walk in [No Acute Distress] : no acute distress [Well Nourished] : well nourished [Well Developed] : well developed [Well-Appearing] : well-appearing [Normal Voice/Communication] : normal voice/communication [Normal Sclera/Conjunctiva] : normal sclera/conjunctiva [PERRL] : pupils equal round and reactive to light [EOMI] : extraocular movements intact [Normal Outer Ear/Nose] : the outer ears and nose were normal in appearance [Normal Oropharynx] : the oropharynx was normal [Normal TMs] : both tympanic membranes were normal [No JVD] : no jugular venous distention [No Lymphadenopathy] : no lymphadenopathy [Supple] : supple [Thyroid Normal, No Nodules] : the thyroid was normal and there were no nodules present [No Respiratory Distress] : no respiratory distress  [No Accessory Muscle Use] : no accessory muscle use [Clear to Auscultation] : lungs were clear to auscultation bilaterally [Normal Rate] : normal rate  [Regular Rhythm] : with a regular rhythm [Normal S1, S2] : normal S1 and S2 [No Murmur] : no murmur heard [No Carotid Bruits] : no carotid bruits [No Abdominal Bruit] : a ~M bruit was not heard ~T in the abdomen [No Varicosities] : no varicosities [Pedal Pulses Present] : the pedal pulses are present [No Edema] : there was no peripheral edema [No Palpable Aorta] : no palpable aorta [No Extremity Clubbing/Cyanosis] : no extremity clubbing/cyanosis [Soft] : abdomen soft [Non Tender] : non-tender [Non-distended] : non-distended [No Masses] : no abdominal mass palpated [No HSM] : no HSM [Normal Bowel Sounds] : normal bowel sounds [Normal Supraclavicular Nodes] : no supraclavicular lymphadenopathy [Normal Posterior Cervical Nodes] : no posterior cervical lymphadenopathy [Normal Anterior Cervical Nodes] : no anterior cervical lymphadenopathy [No CVA Tenderness] : no CVA  tenderness [No Spinal Tenderness] : no spinal tenderness [No Joint Swelling] : no joint swelling [Grossly Normal Strength/Tone] : grossly normal strength/tone [No Rash] : no rash [Coordination Grossly Intact] : coordination grossly intact [No Focal Deficits] : no focal deficits [Normal Gait] : normal gait [Deep Tendon Reflexes (DTR)] : deep tendon reflexes were 2+ and symmetric [Speech Grossly Normal] : speech grossly normal [Memory Grossly Normal] : memory grossly normal [Normal Affect] : the affect was normal [Alert and Oriented x3] : oriented to person, place, and time [Normal Mood] : the mood was normal [Normal Insight/Judgement] : insight and judgment were intact

## 2024-12-06 NOTE — ED PROVIDER NOTE - IV ALTEPLASE ADMIN OUTSIDE HIDDEN
Demi Ratliff Patient Age: 89 year old  MESSAGE:   Patient asking to speak with clinical staff. She canceled today's appointment due to being hospitalized. Please advise. Transferred caller to triage nurse.      WEIGHT AND HEIGHT:   Wt Readings from Last 1 Encounters:   11/13/24 68 kg (150 lb)     Ht Readings from Last 1 Encounters:   11/13/24 5' 6\" (1.676 m)     BMI Readings from Last 1 Encounters:   11/13/24 24.21 kg/m²       ALLERGIES:  Ace inhibitors, Alendronic acid, Aspirin, Cephalosporins, Contrast media, Ethacrynic acid, Iodides, Lidocaine, Niacin, Nitrofurantoin, Penicillins, Phenobarbital, Phenytoin sodium extended, Sulfa antibiotics, Acetaminophen-codeine, Celecoxib, Erythromycin, Gabapentin, Hydralazine, Iodinated diagnostic agents, Lopressor hct, Morphine, Omeprazole, Ranitidine, Spironolactone, Terazosin hcl, Valsartan, Yellow dye   (food or med), Fosamax, and Statins  Current Outpatient Medications   Medication Sig Dispense Refill    furosemide (LASIX) 20 MG tablet Take 20 mg by mouth daily. As directed.      guaiFENesin-codeine (GUAIFENESIN AC) 100-10 MG/5ML liquid 2 tsp q HS prn 118 mL 0    hydrOXYzine (ATARAX) 25 MG tablet Take 1 tablet by mouth every 8 hours as needed for Itching. 30 tablet 0    doxycycline hyclate (VIBRA-TABS) 100 MG tablet Take 1 tablet by mouth in the morning and 1 tablet in the evening. Do all this for 10 days. 20 tablet 0    metoPROLOL succinate (TOPROL-XL) 50 MG 24 hr tablet Take 1 tablet by mouth every morning AND 2 tablets every evening. 270 tablet 3    isosorbide mononitrate (IMDUR) 30 MG 24 hr tablet Take 1 tablet by mouth in the morning and 1 tablet in the evening. 180 tablet 3    furosemide (LASIX) 40 MG tablet Take 20 mg by mouth daily. (Patient not taking: Reported on 12/3/2024)      warfarin (COUMADIN) 1 MG tablet (warfarin) TAKE 4 TABS (4MG) ON MONDAYS/FRIDAYS AND TAKE 3 TABS (3 MG) BY MOUTH ALL OTHER DAYS AND AS DIRECTED. (Patient taking differently: Take 4  tabs (4 mg) on Monday/Wednesday/Friday and take 3 tabs (3 mg) by mouth all other days and as directed.) 299 tablet 1    potassium CHLORIDE (Klor-Con M) 20 MEQ [facility] tablet TAKE 1 TABLET BY MOUTH DAILY. WHILE ON LASIX (Patient not taking: Reported on 12/3/2024) 90 tablet 1    nitroGLYCERIN (NITROSTAT) 0.4 MG sublingual tablet PLACE 1 TABLET UNDER THE TONGUE EVERY 5 MINUTES AS NEEDED FOR CHEST PAIN. (Patient not taking: Reported on 11/27/2024) 25 tablet 0    clopidogrel (PLAVIX) 75 MG tablet TAKE 1 TABLET BY MOUTH EVERY DAY 90 tablet 3    levothyroxine 50 MCG tablet TAKE 1/2 TABLET BY MOUTH DAILY 45 tablet 3    ondansetron (ZOFRAN ODT) 8 MG disintegrating tablet Place 1 tablet onto the tongue every 8 hours as needed for Nausea. (Patient not taking: Reported on 11/27/2024) 10 tablet 0    HYDROcodone-acetaminophen (NORCO)  MG per tablet Take 0.5 tablets by mouth every 6 hours as needed for Pain (severe pain). (Patient not taking: Reported on 12/3/2024)      cyclobenzaprine (FLEXERIL) 5 MG tablet Take 1 tablet by mouth nightly as needed for Muscle spasms. (Patient not taking: Reported on 11/27/2024) 30 tablet 0    triamcinolone (ARISTOCORT) 0.1 % ointment Apply 1 application. topically in the morning and 1 application. in the evening. Apply topically 2 times daily. X 1 week to leg (Patient not taking: Reported on 11/27/2024) 30 g 1    spironolactone (ALDACTONE) 25 MG tablet TAKE 1/2 TABLET BY MOUTH DAILY 45 tablet 2    mometasone (ELOCON) 0.1 % cream Apply a thin layer to the opening of the affected ear twice a day for a week, then as needed for itchiness and dryness (Patient not taking: Reported on 11/27/2024) 15 g 2    Vyzulta 0.024 % opthalmic solution INSTILL 1 DROP INTO BOTH EYES EVERY DAY AT NIGHT      hydroCORTisone (CORTIZONE) 2.5 % ointment Apply topically 2 times daily. To aa on the legs every other day 60 g 1    nystatin (MYCOSTATIN) 069172 UNIT/GM cream Apply topically 2 times daily. (Patient not  taking: Reported on 11/27/2024) 60 g 1    famotidine (PEPCID) 20 MG tablet Take 20 mg by mouth daily. Indications: Heartburn Begin taking on July 2, 2024.      hydrOXYzine (ATARAX) 25 MG tablet Take 1 tablet by mouth every 6 hours as needed for Itching. (Patient not taking: Reported on 12/3/2024) 20 tablet 0    ketoconazole (NIZORAL) 2 % cream APPLY TO THE AFFECTED AREA(S) TWICE DAILY FOR 4 WEEKS. (Patient not taking: Reported on 11/27/2024) 30 g 3    clotrimazole-betamethasone (LOTRISONE) 1-0.05 % cream Apply 1 application. topically in the morning and 1 application. in the evening. (Patient not taking: Reported on 11/27/2024) 120 g 1    Saline Spray 0.65 % Solution Twice Daily      vitamin B-12 (CYANOCOBALAMIN) 1000 MCG tablet Take 500 mcg by mouth daily.      dorzolamide (TRUSOPT) 2 % ophthalmic solution Place 1 drop into both eyes in the morning and 1 drop at noon and 1 drop in the evening.      metroNIDAZOLE (METROCREAM) 0.75 % cream Apply topically 2 times daily. (Patient not taking: Reported on 11/27/2024) 45 g 3    estradiol (ESTRACE) 0.1 MG/GM vaginal cream Place 1 g vaginally 3 days a week. (Patient not taking: Reported on 12/3/2024) 42.5 g 12    nepafenac (NEVANAC) 0.1 % ophthalmic suspension Place 1 drop into both eyes in the morning and 1 drop at noon and 1 drop in the evening. 3 mL 0    calcium citrate-vitamin D (CITRACAL+D) 315-250 MG-UNIT per tablet Take 1 tablet by mouth daily.      Vitamin D, Cholecalciferol, 1000 units Tab Take 25 mcg by mouth daily. Take  by mouth daily. - Oral      Cranberry 300 MG Tab Take 500 mg by mouth 2 times daily as needed (UTI symptoms). Begin taking on July 3, 2024.      MAGNESIUM PO Take 250 mg by mouth daily.       Probiotic Product (PROBIOTIC DAILY) Cap 1 tab daily       No current facility-administered medications for this visit.     PHARMACY to use: see below          Pharmacy preference(s) on file:   [id number] - [place] [id number] [facility]  [id number]  [facility] [address]  Phone: [phone] Fax: [phone]      CALL BACK INFO: DO NOT LEAVE A MESSAGE - contact patient directly  ROUTING: Patient's physician/staff        PCP: Marietta Floyd MD         INS: Payor: [facility] MEDICARE ADVANTAGE / Plan: [employer] MEDICARE ADVANTAGE PPO / Product Type: MEDICARE   PATIENT ADDRESS:  [facility] [facility] [id number]     show

## (undated) DEVICE — VALVE SUCTION EVIS 160/200/240

## (undated) DEVICE — DRSG TAPE TRANSPORE 1"

## (undated) DEVICE — VENODYNE/SCD SLEEVE CALF MEDIUM

## (undated) DEVICE — MASK SURGICAL WITH EYESHIELD ANTIFOG (ORANGE)

## (undated) DEVICE — BRUSH CYTO DISP

## (undated) DEVICE — BALLOON SINGLE FOR BF-UC160F

## (undated) DEVICE — NDL ASPIRATION VIZISHOT2 22G

## (undated) DEVICE — NDL ARCHPOINT PULMONARY 21G

## (undated) DEVICE — SOL IRR POUR NS 0.9% 500ML

## (undated) DEVICE — LABELS BLANK W PEN

## (undated) DEVICE — SUTURE REMOVAL KIT

## (undated) DEVICE — SOL INJ NS 0.9% 100ML

## (undated) DEVICE — GLV 8.5 PROTEXIS (WHITE)

## (undated) DEVICE — STOPCOCK 4-WAY (BLUE) DISCOFIX SPIN-LOCK CONNECTOR

## (undated) DEVICE — DRAPE 3/4 SHEET 52X76"

## (undated) DEVICE — BRONCHOSCOPE ALCON MONARCH DISP

## (undated) DEVICE — SYR LUER LOK 20CC

## (undated) DEVICE — SYR LUER LOK 10CC

## (undated) DEVICE — GLV 7 PROTEXIS (WHITE)

## (undated) DEVICE — TRAP SPECIMEN SPUTUM 40CC

## (undated) DEVICE — WARMING BLANKET LOWER ADULT

## (undated) DEVICE — FORCEP BIOPSY 1.8MM JAW X 100CM DISP

## (undated) DEVICE — TUBING CANNULA SALTER LABS NASAL ADULT 7FT

## (undated) DEVICE — FORCEP BIOPSY BRONCHOSCOPE DISP

## (undated) DEVICE — VALVE BIOPSY BRONCHOVIDEOSCOPE

## (undated) DEVICE — SOL IRR POUR H2O 500ML

## (undated) DEVICE — VALVE SHEATH BRONCHOSCOPE STRL

## (undated) DEVICE — SOL IRR NS 0.9% 250ML

## (undated) DEVICE — BIOPSY FORCEP J&J MONARCH SMOOTH CUP

## (undated) DEVICE — DRAPE TOWEL BLUE 17" X 24"

## (undated) DEVICE — DRSG CURITY GAUZE SPONGE 4 X 4" 12-PLY

## (undated) DEVICE — KIT INTRODUCER MONARCH BRONCHOSCOPE

## (undated) DEVICE — ADAPTER FIBEROPTIC BRONCHOSCOPE DUAL AXIS SWIVEL

## (undated) DEVICE — SOL ANTI FOG

## (undated) DEVICE — PACK BRONCHOSCOPY

## (undated) DEVICE — SYR LUER SLIP TIP 30CC

## (undated) DEVICE — TUBING FLUDICS MONARCH BRONCHOSCOPE